# Patient Record
Sex: MALE | Race: WHITE | Employment: FULL TIME | URBAN - NONMETROPOLITAN AREA
[De-identification: names, ages, dates, MRNs, and addresses within clinical notes are randomized per-mention and may not be internally consistent; named-entity substitution may affect disease eponyms.]

---

## 2017-04-10 RX ORDER — ATORVASTATIN CALCIUM 40 MG/1
TABLET, FILM COATED ORAL
Qty: 90 TABLET | Refills: 1 | OUTPATIENT
Start: 2017-04-10

## 2017-04-11 RX ORDER — ATORVASTATIN CALCIUM 40 MG/1
TABLET, FILM COATED ORAL
Qty: 90 TABLET | Refills: 0 | Status: SHIPPED | OUTPATIENT
Start: 2017-04-11 | End: 2017-07-11 | Stop reason: SDUPTHER

## 2017-05-01 ENCOUNTER — OFFICE VISIT (OUTPATIENT)
Dept: FAMILY MEDICINE CLINIC | Age: 47
End: 2017-05-01

## 2017-05-01 VITALS
HEIGHT: 74 IN | WEIGHT: 239.4 LBS | RESPIRATION RATE: 16 BRPM | HEART RATE: 76 BPM | SYSTOLIC BLOOD PRESSURE: 120 MMHG | DIASTOLIC BLOOD PRESSURE: 80 MMHG | BODY MASS INDEX: 30.72 KG/M2

## 2017-05-01 DIAGNOSIS — Z00.00 ROUTINE GENERAL MEDICAL EXAMINATION AT A HEALTH CARE FACILITY: Primary | ICD-10-CM

## 2017-05-01 DIAGNOSIS — G47.33 OBSTRUCTIVE SLEEP APNEA: ICD-10-CM

## 2017-05-01 DIAGNOSIS — E78.00 PURE HYPERCHOLESTEROLEMIA: ICD-10-CM

## 2017-05-01 DIAGNOSIS — J30.1 SEASONAL ALLERGIC RHINITIS DUE TO POLLEN: ICD-10-CM

## 2017-05-01 DIAGNOSIS — Z11.4 SCREENING FOR HIV (HUMAN IMMUNODEFICIENCY VIRUS): ICD-10-CM

## 2017-05-01 DIAGNOSIS — E66.9 OBESITY (BMI 30.0-34.9): ICD-10-CM

## 2017-05-01 PROCEDURE — 99396 PREV VISIT EST AGE 40-64: CPT | Performed by: FAMILY MEDICINE

## 2017-05-01 ASSESSMENT — PATIENT HEALTH QUESTIONNAIRE - PHQ9
SUM OF ALL RESPONSES TO PHQ QUESTIONS 1-9: 0
1. LITTLE INTEREST OR PLEASURE IN DOING THINGS: 0
SUM OF ALL RESPONSES TO PHQ9 QUESTIONS 1 & 2: 0
2. FEELING DOWN, DEPRESSED OR HOPELESS: 0

## 2017-12-26 RX ORDER — FENOFIBRATE 160 MG/1
TABLET ORAL
Qty: 90 TABLET | Refills: 3 | Status: SHIPPED | OUTPATIENT
Start: 2017-12-26 | End: 2018-12-24 | Stop reason: SDUPTHER

## 2018-04-17 ENCOUNTER — OFFICE VISIT (OUTPATIENT)
Dept: FAMILY MEDICINE CLINIC | Age: 48
End: 2018-04-17
Payer: COMMERCIAL

## 2018-04-17 VITALS
BODY MASS INDEX: 31.97 KG/M2 | WEIGHT: 249 LBS | RESPIRATION RATE: 18 BRPM | HEART RATE: 80 BPM | DIASTOLIC BLOOD PRESSURE: 80 MMHG | SYSTOLIC BLOOD PRESSURE: 138 MMHG

## 2018-04-17 DIAGNOSIS — G47.9 FATIGUE DUE TO SLEEP PATTERN DISTURBANCE: ICD-10-CM

## 2018-04-17 DIAGNOSIS — Z00.00 ROUTINE GENERAL MEDICAL EXAMINATION AT A HEALTH CARE FACILITY: Primary | ICD-10-CM

## 2018-04-17 DIAGNOSIS — R06.83 SNORING: ICD-10-CM

## 2018-04-17 DIAGNOSIS — R53.83 FATIGUE DUE TO SLEEP PATTERN DISTURBANCE: ICD-10-CM

## 2018-04-17 DIAGNOSIS — J30.1 CHRONIC SEASONAL ALLERGIC RHINITIS DUE TO POLLEN: ICD-10-CM

## 2018-04-17 DIAGNOSIS — E78.00 PURE HYPERCHOLESTEROLEMIA: ICD-10-CM

## 2018-04-17 DIAGNOSIS — E66.9 OBESITY (BMI 30.0-34.9): ICD-10-CM

## 2018-04-17 PROCEDURE — 99214 OFFICE O/P EST MOD 30 MIN: CPT | Performed by: FAMILY MEDICINE

## 2018-04-17 RX ORDER — FENOFIBRATE 160 MG/1
TABLET ORAL
Qty: 90 TABLET | Refills: 3 | Status: CANCELLED | OUTPATIENT
Start: 2018-04-17

## 2018-04-17 RX ORDER — ATORVASTATIN CALCIUM 40 MG/1
TABLET, FILM COATED ORAL
Qty: 90 TABLET | Refills: 3 | Status: CANCELLED | OUTPATIENT
Start: 2018-04-17

## 2018-04-17 RX ORDER — ASPIRIN 81 MG/1
81 TABLET ORAL DAILY
Qty: 30 TABLET | Refills: 3 | COMMUNITY
Start: 2018-04-17

## 2018-04-19 ENCOUNTER — HOSPITAL ENCOUNTER (OUTPATIENT)
Age: 48
Discharge: HOME | End: 2018-04-19
Payer: COMMERCIAL

## 2018-04-19 DIAGNOSIS — Z00.00 ROUTINE GENERAL MEDICAL EXAMINATION AT A HEALTH CARE FACILITY: ICD-10-CM

## 2018-04-19 LAB
ALBUMIN SERPL-MCNC: 4.6 G/DL (ref 3.5–5.1)
ALP BLD-CCNC: 58 U/L (ref 38–126)
ALT SERPL-CCNC: 27 U/L (ref 11–66)
ANION GAP SERPL CALCULATED.3IONS-SCNC: 17 MEQ/L (ref 8–16)
AST SERPL-CCNC: 25 U/L (ref 5–40)
BILIRUB SERPL-MCNC: 0.5 MG/DL (ref 0.3–1.2)
BUN BLDV-MCNC: 22 MG/DL (ref 7–22)
CALCIUM SERPL-MCNC: 10.1 MG/DL (ref 8.5–10.5)
CHLORIDE BLD-SCNC: 103 MEQ/L (ref 98–111)
CHOLESTEROL, TOTAL: 158 MG/DL (ref 100–199)
CO2: 25 MEQ/L (ref 23–33)
CREAT SERPL-MCNC: 1.2 MG/DL (ref 0.4–1.2)
GFR SERPL CREATININE-BSD FRML MDRD: 65 ML/MIN/1.73M2
GLUCOSE BLD-MCNC: 99 MG/DL (ref 70–108)
HCT VFR BLD CALC: 41.8 % (ref 42–52)
HDLC SERPL-MCNC: 41 MG/DL
HEMOGLOBIN: 14.4 GM/DL (ref 14–18)
LDL CHOLESTEROL CALCULATED: 87 MG/DL
MCH RBC QN AUTO: 30.4 PG (ref 27–31)
MCHC RBC AUTO-ENTMCNC: 34.5 GM/DL (ref 33–37)
MCV RBC AUTO: 88.1 FL (ref 80–94)
PDW BLD-RTO: 13.3 % (ref 11.5–14.5)
PLATELET # BLD: 276 THOU/MM3 (ref 130–400)
PMV BLD AUTO: 8.8 FL (ref 7.4–10.4)
POTASSIUM SERPL-SCNC: 4.4 MEQ/L (ref 3.5–5.2)
RBC # BLD: 4.75 MILL/MM3 (ref 4.7–6.1)
SODIUM BLD-SCNC: 145 MEQ/L (ref 135–145)
TOTAL PROTEIN: 7.5 G/DL (ref 6.1–8)
TRIGL SERPL-MCNC: 148 MG/DL (ref 0–199)
TSH SERPL DL<=0.05 MIU/L-ACNC: 2.5 UIU/ML (ref 0.4–4.2)
WBC # BLD: 5.4 THOU/MM3 (ref 4.8–10.8)

## 2018-04-19 PROCEDURE — 80053 COMPREHEN METABOLIC PANEL: CPT

## 2018-04-19 PROCEDURE — 80061 LIPID PANEL: CPT

## 2018-04-19 PROCEDURE — 36415 COLL VENOUS BLD VENIPUNCTURE: CPT

## 2018-04-19 PROCEDURE — 84443 ASSAY THYROID STIM HORMONE: CPT

## 2018-04-19 PROCEDURE — 85027 COMPLETE CBC AUTOMATED: CPT

## 2018-05-07 ENCOUNTER — INITIAL CONSULT (OUTPATIENT)
Dept: PULMONOLOGY | Age: 48
End: 2018-05-07
Payer: COMMERCIAL

## 2018-05-07 VITALS
SYSTOLIC BLOOD PRESSURE: 124 MMHG | WEIGHT: 248.4 LBS | DIASTOLIC BLOOD PRESSURE: 82 MMHG | HEART RATE: 77 BPM | HEIGHT: 73 IN | RESPIRATION RATE: 14 BRPM | OXYGEN SATURATION: 97 % | BODY MASS INDEX: 32.92 KG/M2

## 2018-05-07 DIAGNOSIS — E66.9 OBESITY (BMI 30-39.9): ICD-10-CM

## 2018-05-07 DIAGNOSIS — R06.83 SNORING: ICD-10-CM

## 2018-05-07 DIAGNOSIS — G47.8 NON-RESTORATIVE SLEEP: ICD-10-CM

## 2018-05-07 DIAGNOSIS — G47.10 HYPERSOMNIA: Primary | ICD-10-CM

## 2018-05-07 PROCEDURE — 99203 OFFICE O/P NEW LOW 30 MIN: CPT | Performed by: INTERNAL MEDICINE

## 2018-05-14 ENCOUNTER — TELEPHONE (OUTPATIENT)
Dept: FAMILY MEDICINE CLINIC | Age: 48
End: 2018-05-14

## 2018-05-17 ENCOUNTER — TELEPHONE (OUTPATIENT)
Dept: FAMILY MEDICINE CLINIC | Age: 48
End: 2018-05-17

## 2018-05-17 ENCOUNTER — NURSE ONLY (OUTPATIENT)
Dept: FAMILY MEDICINE CLINIC | Age: 48
End: 2018-05-17
Payer: COMMERCIAL

## 2018-05-17 VITALS — SYSTOLIC BLOOD PRESSURE: 140 MMHG | HEART RATE: 76 BPM | DIASTOLIC BLOOD PRESSURE: 74 MMHG

## 2018-05-17 DIAGNOSIS — Z01.30 BP CHECK: Primary | ICD-10-CM

## 2018-05-17 PROCEDURE — 99211 OFF/OP EST MAY X REQ PHY/QHP: CPT | Performed by: FAMILY MEDICINE

## 2018-05-22 ENCOUNTER — HOSPITAL ENCOUNTER (OUTPATIENT)
Dept: SLEEP CENTER | Age: 48
Discharge: HOME | End: 2018-05-24
Payer: COMMERCIAL

## 2018-05-22 ENCOUNTER — TELEPHONE (OUTPATIENT)
Dept: SLEEP CENTER | Age: 48
End: 2018-05-22

## 2018-05-22 DIAGNOSIS — R06.83 SNORING: ICD-10-CM

## 2018-05-22 DIAGNOSIS — G47.10 HYPERSOMNIA: ICD-10-CM

## 2018-05-22 DIAGNOSIS — G47.8 NON-RESTORATIVE SLEEP: ICD-10-CM

## 2018-05-22 DIAGNOSIS — E66.9 OBESITY (BMI 30-39.9): ICD-10-CM

## 2018-05-22 PROCEDURE — 95810 POLYSOM 6/> YRS 4/> PARAM: CPT

## 2018-05-22 NOTE — TELEPHONE ENCOUNTER
Darwin Jones is scheduled for [facility], 05/22/18. This is what the note on 05/07/18 says. However, it doesn't read split night study on the order. Can you please place a new order stating split night? Thanks,      . Mikel Gallego

## 2018-05-23 LAB — STATUS: NORMAL

## 2018-06-05 ENCOUNTER — OFFICE VISIT (OUTPATIENT)
Dept: PULMONOLOGY | Age: 48
End: 2018-06-05
Payer: COMMERCIAL

## 2018-06-05 VITALS
OXYGEN SATURATION: 97 % | WEIGHT: 248.4 LBS | SYSTOLIC BLOOD PRESSURE: 112 MMHG | BODY MASS INDEX: 32.92 KG/M2 | HEART RATE: 73 BPM | HEIGHT: 73 IN | DIASTOLIC BLOOD PRESSURE: 74 MMHG

## 2018-06-05 DIAGNOSIS — Z72.821 POOR SLEEP HYGIENE: ICD-10-CM

## 2018-06-05 DIAGNOSIS — R06.83 SNORING: Primary | ICD-10-CM

## 2018-06-05 PROCEDURE — 99213 OFFICE O/P EST LOW 20 MIN: CPT | Performed by: PHYSICIAN ASSISTANT

## 2018-07-17 RX ORDER — ATORVASTATIN CALCIUM 40 MG/1
TABLET, FILM COATED ORAL
Qty: 90 TABLET | Refills: 3 | Status: SHIPPED | OUTPATIENT
Start: 2018-07-17 | End: 2018-07-17 | Stop reason: SDUPTHER

## 2018-07-17 RX ORDER — ATORVASTATIN CALCIUM 40 MG/1
TABLET, FILM COATED ORAL
Qty: 90 TABLET | Refills: 3 | Status: SHIPPED | OUTPATIENT
Start: 2018-07-17 | End: 2019-07-23 | Stop reason: SDUPTHER

## 2018-12-26 RX ORDER — FENOFIBRATE 160 MG/1
TABLET ORAL
Qty: 90 TABLET | Refills: 3 | Status: SHIPPED | OUTPATIENT
Start: 2018-12-26 | End: 2019-11-07 | Stop reason: SDUPTHER

## 2019-07-22 ENCOUNTER — TELEPHONE (OUTPATIENT)
Dept: FAMILY MEDICINE CLINIC | Age: 49
End: 2019-07-22

## 2019-07-22 NOTE — TELEPHONE ENCOUNTER
Spoke with Treva Mcclain at [facility] in 2 month supply Lipitor 10mg, 1 tab daily    Left message on patients voicemail stating rx was called in and any further questions to call the office back

## 2019-07-23 RX ORDER — ATORVASTATIN CALCIUM 40 MG/1
TABLET, FILM COATED ORAL
Qty: 90 TABLET | Refills: 3 | Status: SHIPPED | OUTPATIENT
Start: 2019-07-23 | End: 2020-08-20

## 2019-08-26 ENCOUNTER — HOSPITAL ENCOUNTER (OUTPATIENT)
Dept: GENERAL RADIOLOGY | Age: 49
Discharge: HOME | End: 2019-08-26
Payer: COMMERCIAL

## 2019-08-26 ENCOUNTER — OFFICE VISIT (OUTPATIENT)
Dept: FAMILY MEDICINE CLINIC | Age: 49
End: 2019-08-26
Payer: COMMERCIAL

## 2019-08-26 ENCOUNTER — HOSPITAL ENCOUNTER (OUTPATIENT)
Age: 49
Discharge: HOME | End: 2019-08-26
Payer: COMMERCIAL

## 2019-08-26 VITALS
HEART RATE: 67 BPM | RESPIRATION RATE: 18 BRPM | HEIGHT: 73 IN | SYSTOLIC BLOOD PRESSURE: 128 MMHG | DIASTOLIC BLOOD PRESSURE: 80 MMHG | BODY MASS INDEX: 33.95 KG/M2 | TEMPERATURE: 97.4 F | WEIGHT: 256.2 LBS | OXYGEN SATURATION: 98 %

## 2019-08-26 DIAGNOSIS — J30.2 SEASONAL ALLERGIES: ICD-10-CM

## 2019-08-26 DIAGNOSIS — E78.00 PURE HYPERCHOLESTEROLEMIA: ICD-10-CM

## 2019-08-26 DIAGNOSIS — M79.671 RIGHT FOOT PAIN: ICD-10-CM

## 2019-08-26 DIAGNOSIS — Z00.00 ROUTINE GENERAL MEDICAL EXAMINATION AT A HEALTH CARE FACILITY: Primary | ICD-10-CM

## 2019-08-26 DIAGNOSIS — K21.9 GASTROESOPHAGEAL REFLUX DISEASE WITHOUT ESOPHAGITIS: ICD-10-CM

## 2019-08-26 PROCEDURE — 99396 PREV VISIT EST AGE 40-64: CPT | Performed by: FAMILY MEDICINE

## 2019-08-26 PROCEDURE — 99214 OFFICE O/P EST MOD 30 MIN: CPT | Performed by: FAMILY MEDICINE

## 2019-08-26 PROCEDURE — 73630 X-RAY EXAM OF FOOT: CPT

## 2019-08-26 RX ORDER — OMEPRAZOLE 20 MG/1
20 CAPSULE, DELAYED RELEASE ORAL 2 TIMES DAILY
Qty: 30 CAPSULE | Refills: 3 | Status: SHIPPED | OUTPATIENT
Start: 2019-08-26 | End: 2019-12-06 | Stop reason: SDUPTHER

## 2019-08-26 ASSESSMENT — PATIENT HEALTH QUESTIONNAIRE - PHQ9
SUM OF ALL RESPONSES TO PHQ9 QUESTIONS 1 & 2: 0
1. LITTLE INTEREST OR PLEASURE IN DOING THINGS: 0
SUM OF ALL RESPONSES TO PHQ QUESTIONS 1-9: 0
2. FEELING DOWN, DEPRESSED OR HOPELESS: 0
SUM OF ALL RESPONSES TO PHQ QUESTIONS 1-9: 0

## 2019-08-26 NOTE — PROGRESS NOTES
pain on the lateral side worse with increased exercise and walking like with golfing. This got worse over the 4th of July weekend. The pain radiates now up the side of the calf from the foot. This is a little better when he wears regular shoes and worse with sandals and flip flops. Reviewed chart forpast medical history , surgical history , allergies, social history , family history and medications. Health Maintenance   Topic Date Due    Flu vaccine (1) 09/01/2019    Lipid screen  04/19/2023    DTaP/Tdap/Td vaccine (2 - Td) 10/22/2025    HIV screen  Completed    Pneumococcal 0-64 years Vaccine  Aged Out       Subjective:      Constitutional:Negative for fever, chills, diaphoresis, activity change, appetite change and fatigue. HENT: Negative for hearing loss, ear pain, congestion, sore throat, rhinorrhea, postnasal drip and ear discharge. Eyes: Negative for photophobia and visual disturbance. Respiratory: Negative for cough, chest tightness, shortness of breath and wheezing. Cardiovascular: Negative for chest pain and leg swelling. Gastrointestinal: Negative for nausea, vomiting, abdominal pain, diarrhea and constipation. Genitourinary: Negative for dysuria, urgency and frequency. Neurological: Negative for weakness, light-headedness and headaches. Psychiatric/Behavioral: Negative for sleep disturbance. Objective:     Vitals:    08/26/19 1550   BP: 128/80   Site: Left Upper Arm   Position: Sitting   Cuff Size: Large Adult   Pulse: 67   Resp: 18   Temp: 97.4 °F (36.3 °C)   TempSrc: Temporal   SpO2: 98%   Weight: 256 lb 3.2 oz (116.2 kg)   Height: 6' 0.99\" (1.854 m)     Wt Readings from Last 3 Encounters:   08/26/19 256 lb 3.2 oz (116.2 kg)   06/05/18 248 lb 6.4 oz (112.7 kg)   05/07/18 248 lb 6.4 oz (112.7 kg)       Physical Exam  Physical Exam   Constitutional: Vital signs are normal. He appears well-developed and well-nourished. He is active.    HENT:   Head: Normocephalic and atraumatic. Right Ear: Tympanic membrane, external ear and ear canal normal. No drainage or tenderness. Left Ear: Tympanic membrane, external ear and ear canal normal. No drainage or tenderness. Nose: Nose normal. No mucosal edema or rhinorrhea. Mouth/Throat: Uvula is midline, oropharynx is clear and moist and mucous membranes are normal. Mucous membranes are not pale. Normal dentition. No posterior oropharyngeal edema or posterior oropharyngeal erythema. Eyes: Lids are normal. Right eye exhibits no chemosis and no discharge. Left eye exhibits no chemosis and no drainage. Right conjunctiva has no hemorrhage. Left conjunctiva has no hemorrhage. Right eye exhibits normal extraocular motion. Left eye exhibits normal extraocular motion. Right pupil is round and reactive. Left pupil is round and reactive. Pupils are equal.   Cardiovascular: Normal rate, regular rhythm, S1 normal, S2 normal and normal heart sounds. Exam reveals no gallop. No murmur heard. Pulmonary/Chest: Effort normal and breath sounds normal. No respiratory distress. He has no wheezes. He has no rhonchi. He has no rales. Abdominal: Soft. Normal appearance and bowel sounds are normal. He exhibits no distension and no mass. There is no hepatosplenomegaly. No tenderness. He has no rigidity, no rebound and no guarding. No hernia. Musculoskeletal:   Right foot pain on the lateral side at the base of the fitfh metatarsal with palpation on the sole and the dorsum of the foot. Right lower leg: He exhibits no edema. Left lower leg: He exhibits no edema. Neurological: He is alert. Oriented and pleasent        Assessment/Plan   Luis Richardson was seen today for annual exam.    Diagnoses and all orders for this visit:    Routine general medical examination at a health care facility  -     CBC; Future  -     Comprehensive Metabolic Panel; Future  -     Lipid Panel;  Future  -     TSH With Reflex Ft4; Future    Gastroesophageal reflux

## 2019-08-27 ENCOUNTER — HOSPITAL ENCOUNTER (OUTPATIENT)
Age: 49
Discharge: HOME | End: 2019-08-27
Payer: COMMERCIAL

## 2019-08-27 DIAGNOSIS — Z00.00 ROUTINE GENERAL MEDICAL EXAMINATION AT A HEALTH CARE FACILITY: ICD-10-CM

## 2019-08-27 LAB
ALBUMIN SERPL-MCNC: 4.5 G/DL (ref 3.5–5.1)
ALP BLD-CCNC: 53 U/L (ref 38–126)
ALT SERPL-CCNC: 19 U/L (ref 11–66)
ANION GAP SERPL CALCULATED.3IONS-SCNC: 13 MEQ/L (ref 8–16)
AST SERPL-CCNC: 20 U/L (ref 5–40)
BILIRUB SERPL-MCNC: 0.4 MG/DL (ref 0.3–1.2)
BUN BLDV-MCNC: 21 MG/DL (ref 7–22)
CALCIUM SERPL-MCNC: 9.4 MG/DL (ref 8.5–10.5)
CHLORIDE BLD-SCNC: 104 MEQ/L (ref 98–111)
CHOLESTEROL, TOTAL: 168 MG/DL (ref 100–199)
CO2: 23 MEQ/L (ref 23–33)
CREAT SERPL-MCNC: 1.1 MG/DL (ref 0.4–1.2)
ERYTHROCYTE [DISTWIDTH] IN BLOOD BY AUTOMATED COUNT: 13 % (ref 11.5–14.5)
ERYTHROCYTE [DISTWIDTH] IN BLOOD BY AUTOMATED COUNT: 43.5 FL (ref 35–45)
GFR SERPL CREATININE-BSD FRML MDRD: 72 ML/MIN/1.73M2
GLUCOSE BLD-MCNC: 91 MG/DL (ref 70–108)
HCT VFR BLD CALC: 43.2 % (ref 42–52)
HDLC SERPL-MCNC: 39 MG/DL
HEMOGLOBIN: 13.9 GM/DL (ref 14–18)
LDL CHOLESTEROL CALCULATED: 95 MG/DL
MCH RBC QN AUTO: 29.7 PG (ref 26–33)
MCHC RBC AUTO-ENTMCNC: 32.2 GM/DL (ref 32.2–35.5)
MCV RBC AUTO: 92.3 FL (ref 80–94)
PLATELET # BLD: 273 THOU/MM3 (ref 130–400)
PMV BLD AUTO: 10.4 FL (ref 9.4–12.4)
POTASSIUM SERPL-SCNC: 4.4 MEQ/L (ref 3.5–5.2)
RBC # BLD: 4.68 MILL/MM3 (ref 4.7–6.1)
SODIUM BLD-SCNC: 140 MEQ/L (ref 135–145)
TOTAL PROTEIN: 6.7 G/DL (ref 6.1–8)
TRIGL SERPL-MCNC: 168 MG/DL (ref 0–199)
TSH SERPL DL<=0.05 MIU/L-ACNC: 2.4 UIU/ML (ref 0.4–4.2)
WBC # BLD: 5 THOU/MM3 (ref 4.8–10.8)

## 2019-08-27 PROCEDURE — 85027 COMPLETE CBC AUTOMATED: CPT

## 2019-08-27 PROCEDURE — 80053 COMPREHEN METABOLIC PANEL: CPT

## 2019-08-27 PROCEDURE — 36415 COLL VENOUS BLD VENIPUNCTURE: CPT

## 2019-08-27 PROCEDURE — 80061 LIPID PANEL: CPT

## 2019-08-27 PROCEDURE — 84443 ASSAY THYROID STIM HORMONE: CPT

## 2019-10-08 ENCOUNTER — TELEPHONE (OUTPATIENT)
Dept: FAMILY MEDICINE CLINIC | Age: 49
End: 2019-10-08

## 2019-10-09 ENCOUNTER — NURSE ONLY (OUTPATIENT)
Dept: FAMILY MEDICINE CLINIC | Age: 49
End: 2019-10-09
Payer: COMMERCIAL

## 2019-10-09 VITALS — DIASTOLIC BLOOD PRESSURE: 90 MMHG | HEART RATE: 68 BPM | SYSTOLIC BLOOD PRESSURE: 140 MMHG

## 2019-10-09 DIAGNOSIS — M79.602 PAIN OF LEFT UPPER EXTREMITY: ICD-10-CM

## 2019-10-09 DIAGNOSIS — I10 HYPERTENSION, UNSPECIFIED TYPE: Primary | ICD-10-CM

## 2019-10-09 PROCEDURE — 99211 OFF/OP EST MAY X REQ PHY/QHP: CPT | Performed by: FAMILY MEDICINE

## 2019-10-09 PROCEDURE — 93000 ELECTROCARDIOGRAM COMPLETE: CPT | Performed by: FAMILY MEDICINE

## 2019-10-10 RX ORDER — LISINOPRIL 10 MG/1
10 TABLET ORAL DAILY
Qty: 30 TABLET | Refills: 5 | Status: SHIPPED | OUTPATIENT
Start: 2019-10-10 | End: 2019-11-13 | Stop reason: SDUPTHER

## 2019-10-29 ENCOUNTER — HOSPITAL ENCOUNTER (OUTPATIENT)
Dept: NON INVASIVE DIAGNOSTICS | Age: 49
Discharge: HOME | End: 2019-10-29
Payer: COMMERCIAL

## 2019-10-29 DIAGNOSIS — M79.602 PAIN OF LEFT UPPER EXTREMITY: ICD-10-CM

## 2019-10-29 DIAGNOSIS — I10 HYPERTENSION, UNSPECIFIED TYPE: ICD-10-CM

## 2019-10-29 PROCEDURE — 93017 CV STRESS TEST TRACING ONLY: CPT

## 2019-11-08 RX ORDER — FENOFIBRATE 160 MG/1
TABLET ORAL
Qty: 90 TABLET | Refills: 4 | Status: SHIPPED | OUTPATIENT
Start: 2019-11-08 | End: 2020-08-31 | Stop reason: SDUPTHER

## 2019-11-13 ENCOUNTER — OFFICE VISIT (OUTPATIENT)
Dept: FAMILY MEDICINE CLINIC | Age: 49
End: 2019-11-13
Payer: COMMERCIAL

## 2019-11-13 VITALS
RESPIRATION RATE: 14 BRPM | HEIGHT: 73 IN | DIASTOLIC BLOOD PRESSURE: 68 MMHG | BODY MASS INDEX: 33.27 KG/M2 | SYSTOLIC BLOOD PRESSURE: 110 MMHG | HEART RATE: 72 BPM | WEIGHT: 251 LBS

## 2019-11-13 DIAGNOSIS — K21.9 GASTROESOPHAGEAL REFLUX DISEASE WITHOUT ESOPHAGITIS: ICD-10-CM

## 2019-11-13 DIAGNOSIS — E78.00 PURE HYPERCHOLESTEROLEMIA: ICD-10-CM

## 2019-11-13 DIAGNOSIS — J30.2 SEASONAL ALLERGIES: ICD-10-CM

## 2019-11-13 DIAGNOSIS — I10 HYPERTENSION, UNSPECIFIED TYPE: Primary | ICD-10-CM

## 2019-11-13 DIAGNOSIS — Z00.00 ROUTINE GENERAL MEDICAL EXAMINATION AT A HEALTH CARE FACILITY: ICD-10-CM

## 2019-11-13 PROBLEM — M79.671 PAIN IN RIGHT FOOT: Status: ACTIVE | Noted: 2019-11-13

## 2019-11-13 PROCEDURE — 99214 OFFICE O/P EST MOD 30 MIN: CPT | Performed by: FAMILY MEDICINE

## 2019-11-13 RX ORDER — LISINOPRIL 10 MG/1
10 TABLET ORAL DAILY
Qty: 90 TABLET | Refills: 5 | Status: SHIPPED | OUTPATIENT
Start: 2019-11-13 | End: 2020-08-31 | Stop reason: SDUPTHER

## 2019-12-06 DIAGNOSIS — K21.9 GASTROESOPHAGEAL REFLUX DISEASE WITHOUT ESOPHAGITIS: ICD-10-CM

## 2019-12-09 RX ORDER — OMEPRAZOLE 20 MG/1
CAPSULE, DELAYED RELEASE ORAL
Qty: 30 CAPSULE | Refills: 24 | Status: SHIPPED | OUTPATIENT
Start: 2019-12-09 | End: 2022-05-19 | Stop reason: ALTCHOICE

## 2020-01-21 ENCOUNTER — TELEPHONE (OUTPATIENT)
Dept: FAMILY MEDICINE CLINIC | Age: 50
End: 2020-01-21

## 2020-01-21 NOTE — TELEPHONE ENCOUNTER
Patient asking for preventive tamiflu-son tested + for influenza B      Per klass  tamiflu 75mg once a day [id number] called into [facility]. Phoned into [facility].

## 2020-08-20 RX ORDER — ATORVASTATIN CALCIUM 40 MG/1
TABLET, FILM COATED ORAL
Qty: 90 TABLET | Refills: 3 | Status: SHIPPED | OUTPATIENT
Start: 2020-08-20 | End: 2021-03-03

## 2020-08-31 ENCOUNTER — OFFICE VISIT (OUTPATIENT)
Dept: FAMILY MEDICINE CLINIC | Age: 50
End: 2020-08-31
Payer: COMMERCIAL

## 2020-08-31 VITALS
WEIGHT: 259 LBS | DIASTOLIC BLOOD PRESSURE: 68 MMHG | SYSTOLIC BLOOD PRESSURE: 110 MMHG | BODY MASS INDEX: 34.33 KG/M2 | TEMPERATURE: 97.7 F | RESPIRATION RATE: 16 BRPM | HEART RATE: 68 BPM | HEIGHT: 73 IN

## 2020-08-31 PROCEDURE — 99396 PREV VISIT EST AGE 40-64: CPT | Performed by: FAMILY MEDICINE

## 2020-08-31 RX ORDER — ROSUVASTATIN CALCIUM 20 MG/1
20 TABLET, COATED ORAL DAILY
Qty: 90 TABLET | Refills: 1 | Status: SHIPPED | OUTPATIENT
Start: 2020-08-31 | End: 2021-03-03 | Stop reason: SDUPTHER

## 2020-08-31 RX ORDER — FENOFIBRATE 160 MG/1
TABLET ORAL
Qty: 90 TABLET | Refills: 4 | Status: SHIPPED | OUTPATIENT
Start: 2020-08-31 | End: 2021-09-08 | Stop reason: SDUPTHER

## 2020-08-31 RX ORDER — OMEPRAZOLE 20 MG/1
CAPSULE, DELAYED RELEASE ORAL
Qty: 30 CAPSULE | Refills: 24 | Status: CANCELLED | OUTPATIENT
Start: 2020-08-31

## 2020-08-31 RX ORDER — LISINOPRIL 10 MG/1
10 TABLET ORAL DAILY
Qty: 90 TABLET | Refills: 5 | Status: SHIPPED | OUTPATIENT
Start: 2020-08-31 | End: 2021-09-08 | Stop reason: SDUPTHER

## 2020-08-31 SDOH — ECONOMIC STABILITY: FOOD INSECURITY: WITHIN THE PAST 12 MONTHS, YOU WORRIED THAT YOUR FOOD WOULD RUN OUT BEFORE YOU GOT MONEY TO BUY MORE.: NEVER TRUE

## 2020-08-31 SDOH — ECONOMIC STABILITY: FOOD INSECURITY: WITHIN THE PAST 12 MONTHS, THE FOOD YOU BOUGHT JUST DIDN'T LAST AND YOU DIDN'T HAVE MONEY TO GET MORE.: NEVER TRUE

## 2020-08-31 SDOH — ECONOMIC STABILITY: INCOME INSECURITY: HOW HARD IS IT FOR YOU TO PAY FOR THE VERY BASICS LIKE FOOD, HOUSING, MEDICAL CARE, AND HEATING?: NOT HARD AT ALL

## 2020-08-31 ASSESSMENT — PATIENT HEALTH QUESTIONNAIRE - PHQ9
SUM OF ALL RESPONSES TO PHQ QUESTIONS 1-9: 0
SUM OF ALL RESPONSES TO PHQ QUESTIONS 1-9: 0
1. LITTLE INTEREST OR PLEASURE IN DOING THINGS: 0
SUM OF ALL RESPONSES TO PHQ9 QUESTIONS 1 & 2: 0
2. FEELING DOWN, DEPRESSED OR HOPELESS: 0

## 2020-08-31 NOTE — PROGRESS NOTES
[address] [place] [address] Arjun Fraser  Dept: [phone]  Dept Fax: [phone]  Loc: [phone]    Elvis Stallings is a 52 y.o. male who presents today for:  Chief Complaint   Patient presents with    Follow-up     HTN, GERD, Hyperscholesterolemia           HPI:     HPI    Here for yearly recheck. Hyperlipidemia: Patient presents with hyperlipidemia. He was tested because screening. His last labs showed   Lab Results   Component Value Date    CHOL 168 08/27/2019    CHOL 158 04/19/2018    CHOL 162 05/04/2017     Lab Results   Component Value Date    TRIG 168 08/27/2019    TRIG 148 04/19/2018    TRIG 143 05/04/2017     Lab Results   Component Value Date    HDL 39 08/27/2019    HDL 41 04/19/2018    HDL 47 05/04/2017     Lab Results   Component Value Date    LDLCALC 95 08/27/2019    LDLCALC 87 04/19/2018    LDLCALC 86 05/04/2017     No results found for: LABVLDL, VLDL  No results found for: CHOLHDLRATIO  There is a family history of hyperlipidemia. There is not a family history of early ischemia heart disease. Blood pressures are higher at home. Much better on lisinopril. GERD controlled on PPI. Allergies controlled on OTC seasonal allergy medications. Reviewed chart forpast medical history , surgical history , allergies, social history , family history and medications.     Health Maintenance   Topic Date Due    Potassium monitoring  08/27/2020    Creatinine monitoring  08/27/2020    Lipid screen  08/27/2020    Flu vaccine (1) 09/01/2020    DTaP/Tdap/Td vaccine (2 - Td) 10/22/2025    HIV screen  Completed    Hepatitis A vaccine  Aged Out    Hepatitis B vaccine  Aged Out    Hib vaccine  Aged Out    Meningococcal (ACWY) vaccine  Aged Out    Pneumococcal 0-64 years Vaccine  Aged Out       Subjective:      Constitutional:Negative for fever, chills, diaphoresis, activity change, appetite change and fatigue. HENT: Negative for hearing loss, ear pain, congestion, sore throat, rhinorrhea, postnasal drip and ear discharge. Eyes: Negative for photophobia and visual disturbance. Respiratory: Negative for cough, chest tightness, shortness of breath and wheezing. Cardiovascular: Negative for chest pain and leg swelling. Gastrointestinal: Negative for nausea, vomiting, abdominal pain, diarrhea and constipation. Genitourinary: Negative for dysuria, urgency and frequency. Neurological: Negative for weakness, light-headedness and headaches. Psychiatric/Behavioral: Negative for sleep disturbance. Objective:     Vitals:    08/31/20 1609   BP: 110/68   Site: Left Upper Arm   Position: Sitting   Cuff Size: Large Adult   Pulse: 68   Resp: 16   Temp: 97.7 °F (36.5 °C)   TempSrc: Temporal   Weight: 259 lb (117.5 kg)   Height: 6' 0.99\" (1.854 m)     Wt Readings from Last 3 Encounters:   08/31/20 259 lb (117.5 kg)   11/13/19 251 lb (113.9 kg)   08/26/19 256 lb 3.2 oz (116.2 kg)       Physical Exam  Constitutional: Vital signs are normal. He appears well-developed and well-nourished. He is active. HENT:   Head: Normocephalic and atraumatic. Right Ear: Tympanic membrane, external ear and ear canal normal. No drainage or tenderness. Left Ear: Tympanic membrane, external ear and ear canal normal. No drainage or tenderness. Nose: Nose normal. No mucosal edema or rhinorrhea. Mouth/Throat: Uvula is midline, oropharynx is clear and moist and mucous membranes are normal. Mucous membranes are not pale. Normal dentition. No posterior oropharyngeal edema or posterior oropharyngeal erythema. Eyes: Lids are normal. Right eye exhibits no chemosis and no discharge. Left eye exhibits no chemosis and no drainage. Right conjunctiva has no hemorrhage. Left conjunctiva has no hemorrhage. Right eye exhibits normal extraocular motion. Left eye exhibits normal extraocular motion. Right pupil is round and reactive.  Left pupil is round and reactive. Pupils are equal.   Cardiovascular: Normal rate, regular rhythm, S1 normal, S2 normal and normal heart sounds. Exam reveals no gallop. No murmur heard. Pulmonary/Chest: Effort normal and breath sounds normal. No respiratory distress. He has no wheezes. He has no rhonchi. He has no rales. Abdominal: Soft. Normal appearance and bowel sounds are normal. He exhibits no distension and no mass. There is no hepatosplenomegaly. No tenderness. He has no rigidity, no rebound and no guarding. No hernia. Musculoskeletal:        Right lower leg: He exhibits no edema. Left lower leg: He exhibits no edema. Neurological: He is alert. Oriented and pleasent        Assessment/Plan   Shante Arriola was seen today for follow-up. Diagnoses and all orders for this visit:    Routine general medical examination at a health care facility  -     CBC; Future  -     Comprehensive Metabolic Panel, Fasting; Future  -     Lipid Panel; Future  -     TSH With Reflex Ft4; Future    Hypertension, unspecified type  -     lisinopril (PRINIVIL;ZESTRIL) 10 MG tablet; Take 1 tablet by mouth daily    Gastroesophageal reflux disease without esophagitis    Pure hypercholesterolemia  -     fenofibrate (TRIGLIDE) 160 MG tablet; TAKE 1 TABLET DAILY  -     rosuvastatin (CRESTOR) 20 MG tablet; Take 1 tablet by mouth daily    Seasonal allergies      No change to medications   Continue healthy diet and exercise  Aspirin daily     Discussed use, benefit, and side effectsof prescribed medications. All patient questions answered. Pt voiced understanding. Reviewed health maintenance. Instructed to continue current medications, diet and exercise. Patient agreed with treatment plan. Followup as directed.      Electronically signed by Stacy Trujillo MD

## 2020-11-12 NOTE — TELEPHONE ENCOUNTER
Can patient get a short term supply of atorvastatin (LIPITOR) 40 MG tablet to his local pharmacy. He is out,  Pharmacy is "[facility].     Please advise patient either way  dolv 4/17/18  donv 8/26/19 (this was next available) .

## 2020-12-29 ENCOUNTER — HOSPITAL ENCOUNTER (OUTPATIENT)
Age: 50
Setting detail: SPECIMEN
Discharge: HOME | End: 2020-12-29
Payer: COMMERCIAL

## 2020-12-29 ENCOUNTER — TELEPHONE (OUTPATIENT)
Dept: FAMILY MEDICINE CLINIC | Age: 50
End: 2020-12-29

## 2020-12-29 DIAGNOSIS — R51.9 SINUS HEADACHE: ICD-10-CM

## 2020-12-29 DIAGNOSIS — R05.9 COUGH: ICD-10-CM

## 2020-12-29 DIAGNOSIS — R53.83 OTHER FATIGUE: ICD-10-CM

## 2020-12-29 DIAGNOSIS — R09.81 NASAL CONGESTION: ICD-10-CM

## 2020-12-29 PROCEDURE — U0003 INFECTIOUS AGENT DETECTION BY NUCLEIC ACID (DNA OR RNA); SEVERE ACUTE RESPIRATORY SYNDROME CORONAVIRUS 2 (SARS-COV-2) (CORONAVIRUS DISEASE [COVID-19]), AMPLIFIED PROBE TECHNIQUE, MAKING USE OF HIGH THROUGHPUT TECHNOLOGIES AS DESCRIBED BY CMS-2020-01-R: HCPCS

## 2020-12-29 RX ORDER — AZITHROMYCIN 250 MG/1
TABLET, FILM COATED ORAL
Qty: 1 PACKET | Refills: 0 | Status: SHIPPED | OUTPATIENT
Start: 2020-12-29 | End: 2021-03-03 | Stop reason: ALTCHOICE

## 2020-12-29 RX ORDER — METHYLPREDNISOLONE 4 MG/1
TABLET ORAL
Qty: 1 KIT | Refills: 0 | Status: SHIPPED | OUTPATIENT
Start: 2020-12-29 | End: 2021-01-01

## 2020-12-29 RX ORDER — HYDROXYCHLOROQUINE SULFATE 200 MG/1
200 TABLET, FILM COATED ORAL 2 TIMES DAILY
Qty: 14 TABLET | Refills: 0 | Status: SHIPPED | OUTPATIENT
Start: 2020-12-29 | End: 2021-01-05

## 2021-01-01 ENCOUNTER — TELEPHONE (OUTPATIENT)
Dept: FAMILY MEDICINE CLINIC | Age: 51
End: 2021-01-01

## 2021-01-01 LAB — SARS-COV-2: DETECTED

## 2021-01-01 RX ORDER — LEVOFLOXACIN 500 MG/1
500 TABLET, FILM COATED ORAL DAILY
Qty: 10 TABLET | Refills: 0 | Status: SHIPPED | OUTPATIENT
Start: 2021-01-01 | End: 2021-01-11

## 2021-01-01 RX ORDER — METHYLPREDNISOLONE 4 MG/1
TABLET ORAL
Qty: 1 KIT | Refills: 0 | Status: SHIPPED | OUTPATIENT
Start: 2021-01-01 | End: 2021-01-07

## 2021-01-01 RX ORDER — ALBUTEROL SULFATE 90 UG/1
2 AEROSOL, METERED RESPIRATORY (INHALATION) 4 TIMES DAILY PRN
Qty: 1 INHALER | Refills: 1 | Status: SHIPPED | OUTPATIENT
Start: 2021-01-01 | End: 2021-03-03

## 2021-01-01 NOTE — TELEPHONE ENCOUNTER
Positive for covid    rx sent to the pharmacy for persistent cough    Call patient 1-1-21    Call on Monday to check progress

## 2021-01-04 NOTE — TELEPHONE ENCOUNTER
Pt states he is feeling better. Still has a cough. No fever. He will call if he needs anything. O2 97%. Pt is getting around and walking, slight SOB that resolves.

## 2021-01-05 ENCOUNTER — TELEPHONE (OUTPATIENT)
Dept: FAMILY MEDICINE CLINIC | Age: 51
End: 2021-01-05

## 2021-01-05 NOTE — TELEPHONE ENCOUNTER
Pt req note to return to work and +covid test to be emailed to him at [email or web address]     Per HD pt was able to return to work 1/2 but he wasn't feeling well enough to return    Pt still has a slight cough, fatigue, mild SOB with activity. He feels he will be up to returning to work 1/7.      Note written and emailed to above

## 2021-02-18 ENCOUNTER — HOSPITAL ENCOUNTER (OUTPATIENT)
Age: 51
Discharge: HOME | End: 2021-02-18
Payer: COMMERCIAL

## 2021-02-18 DIAGNOSIS — Z00.00 ROUTINE GENERAL MEDICAL EXAMINATION AT A HEALTH CARE FACILITY: ICD-10-CM

## 2021-02-18 LAB
ALBUMIN SERPL-MCNC: 4.3 G/DL (ref 3.5–5.1)
ALP BLD-CCNC: 48 U/L (ref 38–126)
ALT SERPL-CCNC: 30 U/L (ref 11–66)
ANION GAP SERPL CALCULATED.3IONS-SCNC: 9 MEQ/L (ref 8–16)
AST SERPL-CCNC: 29 U/L (ref 5–40)
BILIRUB SERPL-MCNC: 0.5 MG/DL (ref 0.3–1.2)
BUN BLDV-MCNC: 17 MG/DL (ref 7–22)
CALCIUM SERPL-MCNC: 9.6 MG/DL (ref 8.5–10.5)
CHLORIDE BLD-SCNC: 108 MEQ/L (ref 98–111)
CHOLESTEROL, TOTAL: 135 MG/DL (ref 100–199)
CO2: 24 MEQ/L (ref 23–33)
CREAT SERPL-MCNC: 1 MG/DL (ref 0.4–1.2)
ERYTHROCYTE [DISTWIDTH] IN BLOOD BY AUTOMATED COUNT: 13.9 % (ref 11.5–14.5)
ERYTHROCYTE [DISTWIDTH] IN BLOOD BY AUTOMATED COUNT: 47 FL (ref 35–45)
GFR SERPL CREATININE-BSD FRML MDRD: 79 ML/MIN/1.73M2
GLUCOSE FASTING: 96 MG/DL (ref 70–108)
HCT VFR BLD CALC: 42.2 % (ref 42–52)
HDLC SERPL-MCNC: 41 MG/DL
HEMOGLOBIN: 13.6 GM/DL (ref 14–18)
LDL CHOLESTEROL CALCULATED: 72 MG/DL
MCH RBC QN AUTO: 29.8 PG (ref 26–33)
MCHC RBC AUTO-ENTMCNC: 32.2 GM/DL (ref 32.2–35.5)
MCV RBC AUTO: 92.3 FL (ref 80–94)
PLATELET # BLD: 257 THOU/MM3 (ref 130–400)
PMV BLD AUTO: 10.7 FL (ref 9.4–12.4)
POTASSIUM SERPL-SCNC: 4.5 MEQ/L (ref 3.5–5.2)
RBC # BLD: 4.57 MILL/MM3 (ref 4.7–6.1)
SODIUM BLD-SCNC: 141 MEQ/L (ref 135–145)
TOTAL PROTEIN: 7.1 G/DL (ref 6.1–8)
TRIGL SERPL-MCNC: 111 MG/DL (ref 0–199)
TSH SERPL DL<=0.05 MIU/L-ACNC: 1.27 UIU/ML (ref 0.4–4.2)
WBC # BLD: 5.6 THOU/MM3 (ref 4.8–10.8)

## 2021-02-18 PROCEDURE — 85027 COMPLETE CBC AUTOMATED: CPT

## 2021-02-18 PROCEDURE — 80053 COMPREHEN METABOLIC PANEL: CPT

## 2021-02-18 PROCEDURE — 80061 LIPID PANEL: CPT

## 2021-02-18 PROCEDURE — 84443 ASSAY THYROID STIM HORMONE: CPT

## 2021-02-18 PROCEDURE — 36415 COLL VENOUS BLD VENIPUNCTURE: CPT

## 2021-03-03 ENCOUNTER — OFFICE VISIT (OUTPATIENT)
Dept: FAMILY MEDICINE CLINIC | Age: 51
End: 2021-03-03
Payer: COMMERCIAL

## 2021-03-03 VITALS
HEART RATE: 67 BPM | OXYGEN SATURATION: 97 % | TEMPERATURE: 97.1 F | HEIGHT: 73 IN | WEIGHT: 260.2 LBS | DIASTOLIC BLOOD PRESSURE: 74 MMHG | SYSTOLIC BLOOD PRESSURE: 124 MMHG | BODY MASS INDEX: 34.48 KG/M2 | RESPIRATION RATE: 18 BRPM

## 2021-03-03 DIAGNOSIS — E66.9 OBESITY (BMI 30.0-34.9): ICD-10-CM

## 2021-03-03 DIAGNOSIS — J30.2 SEASONAL ALLERGIES: ICD-10-CM

## 2021-03-03 DIAGNOSIS — M79.671 RIGHT FOOT PAIN: ICD-10-CM

## 2021-03-03 DIAGNOSIS — E78.00 PURE HYPERCHOLESTEROLEMIA: ICD-10-CM

## 2021-03-03 DIAGNOSIS — Z00.00 ROUTINE GENERAL MEDICAL EXAMINATION AT A HEALTH CARE FACILITY: ICD-10-CM

## 2021-03-03 DIAGNOSIS — G47.33 OBSTRUCTIVE SLEEP APNEA: ICD-10-CM

## 2021-03-03 DIAGNOSIS — I10 HYPERTENSION, UNSPECIFIED TYPE: ICD-10-CM

## 2021-03-03 DIAGNOSIS — K21.9 GASTROESOPHAGEAL REFLUX DISEASE WITHOUT ESOPHAGITIS: Primary | ICD-10-CM

## 2021-03-03 PROCEDURE — 99214 OFFICE O/P EST MOD 30 MIN: CPT | Performed by: FAMILY MEDICINE

## 2021-03-03 RX ORDER — ROSUVASTATIN CALCIUM 20 MG/1
20 TABLET, COATED ORAL DAILY
Qty: 90 TABLET | Refills: 3 | Status: SHIPPED | OUTPATIENT
Start: 2021-03-03 | End: 2021-03-04 | Stop reason: SDUPTHER

## 2021-03-03 NOTE — PROGRESS NOTES
Aged Out    Pneumococcal 0-64 years Vaccine  Aged Out       Subjective:      Constitutional:Negative for fever, chills, diaphoresis, activity change, appetite change and fatigue. HENT: Negative for hearing loss, ear pain, congestion, sore throat, rhinorrhea, postnasal drip and ear discharge. Eyes: Negative for photophobia and visual disturbance. Respiratory: Negative for cough, chest tightness, shortness of breath and wheezing. Cardiovascular: Negative for chest pain and leg swelling. Gastrointestinal: Negative for nausea, vomiting, abdominal pain, diarrhea and constipation. Genitourinary: Negative for dysuria, urgency and frequency. Neurological: Negative for weakness, light-headedness and headaches. Psychiatric/Behavioral: Negative for sleep disturbance.      :     Vitals:    03/03/21 1603   BP: 124/74   Site: Left Upper Arm   Position: Sitting   Cuff Size: Large Adult   Pulse: 67   Resp: 18   Temp: 97.1 °F (36.2 °C)   TempSrc: Temporal   SpO2: 97%   Weight: 260 lb 3.2 oz (118 kg)   Height: 6' 0.99\" (1.854 m)     Wt Readings from Last 3 Encounters:   03/03/21 260 lb 3.2 oz (118 kg)   08/31/20 259 lb (117.5 kg)   11/13/19 251 lb (113.9 kg)       Physical Exam  Physical Exam   Constitutional: Vital signs are normal. He appears well-developed and well-nourished. He is active. HENT:   Head: Normocephalic and atraumatic. Right Ear: Tympanic membrane, external ear and ear canal normal. No drainage or tenderness. Left Ear: Tympanic membrane, external ear and ear canal normal. No drainage or tenderness. Nose: Nose normal. No mucosal edema or rhinorrhea. Mouth/Throat: Uvula is midline, oropharynx is clear and moist and mucous membranes are normal. Mucous membranes are not pale. Normal dentition. No posterior oropharyngeal edema or posterior oropharyngeal erythema. Eyes: Lids are normal. Right eye exhibits no chemosis and no discharge. Left eye exhibits no chemosis and no drainage.  Right conjunctiva has no hemorrhage. Left conjunctiva has no hemorrhage. Right eye exhibits normal extraocular motion. Left eye exhibits normal extraocular motion. Right pupil is round and reactive. Left pupil is round and reactive. Pupils are equal.   Cardiovascular: Normal rate, regular rhythm, S1 normal, S2 normal and normal heart sounds. Exam reveals no gallop. No murmur heard. Pulmonary/Chest: Effort normal and breath sounds normal. No respiratory distress. He has no wheezes. He has no rhonchi. He has no rales. Abdominal: Soft. Normal appearance and bowel sounds are normal. He exhibits no distension and no mass. There is no hepatosplenomegaly. No tenderness. He has no rigidity, no rebound and no guarding. No hernia. Musculoskeletal:        Right lower leg: He exhibits no edema. Left lower leg: He exhibits no edema. Neurological: He is alert. Oriented and pleasent        Assessment/Plan   Mounika Bocanegra was seen today for 6 month follow-up, discuss labs and foot pain. Diagnoses and all orders for this visit:    Gastroesophageal reflux disease without esophagitis    Pure hypercholesterolemia  -     Discontinue: rosuvastatin (CRESTOR) 20 MG tablet; Take 1 tablet by mouth daily    Seasonal allergies    Hypertension, unspecified type    Routine general medical examination at a health care facility    Obstructive sleep apnea    Obesity (BMI 30.0-34. 9)    Right foot pain      No change to medications   Continue healthy diet and exercise  Aspirin daily    [place] foods  Small meals  No late meals     Discussed use, benefit, and side effectsof prescribed medications. All patient questions answered. Pt voiced understanding. Reviewed health maintenance. Instructed to continue current medications, diet and exercise. Patient agreed with treatment plan. Followup as directed.      Electronically signed by Patrick Felix MD

## 2021-03-04 DIAGNOSIS — E78.00 PURE HYPERCHOLESTEROLEMIA: ICD-10-CM

## 2021-03-04 RX ORDER — ROSUVASTATIN CALCIUM 20 MG/1
20 TABLET, COATED ORAL DAILY
Qty: 90 TABLET | Refills: 3 | Status: SHIPPED | OUTPATIENT
Start: 2021-03-04 | End: 2021-03-11 | Stop reason: SDUPTHER

## 2021-03-04 NOTE — TELEPHONE ENCOUNTER
Sandra Stock called requesting a refill on the following medications:  Requested Prescriptions     Pending Prescriptions Disp Refills    rosuvastatin (CRESTOR) 20 MG tablet 90 tablet 3     Sig: Take 1 tablet by mouth daily     Pharmacy verified: yes      Patient states that the Rx needs to go to [facility]; needs to be faxed over to the number listed on his card that was photocopied yesterday.          Date of last visit: 3/4/2021  Date of next visit (if applicable): Visit date not found

## 2021-03-11 ENCOUNTER — TELEPHONE (OUTPATIENT)
Dept: FAMILY MEDICINE CLINIC | Age: 51
End: 2021-03-11

## 2021-03-11 DIAGNOSIS — E78.00 PURE HYPERCHOLESTEROLEMIA: ICD-10-CM

## 2021-03-11 RX ORDER — ROSUVASTATIN CALCIUM 20 MG/1
20 TABLET, COATED ORAL DAILY
Qty: 90 TABLET | Refills: 3 | Status: SHIPPED | OUTPATIENT
Start: 2021-03-11 | End: 2021-03-23 | Stop reason: SDUPTHER

## 2021-03-23 ENCOUNTER — IMMUNIZATION (OUTPATIENT)
Dept: PRIMARY CARE CLINIC | Age: 51
End: 2021-03-23
Payer: COMMERCIAL

## 2021-03-23 DIAGNOSIS — E78.00 PURE HYPERCHOLESTEROLEMIA: ICD-10-CM

## 2021-03-23 PROCEDURE — 0001A COVID-19, PFIZER VACCINE 30MCG/0.3ML DOSE: CPT | Performed by: FAMILY MEDICINE

## 2021-03-23 PROCEDURE — 91300 COVID-19, PFIZER VACCINE 30MCG/0.3ML DOSE: CPT | Performed by: FAMILY MEDICINE

## 2021-03-23 RX ORDER — ROSUVASTATIN CALCIUM 20 MG/1
20 TABLET, COATED ORAL DAILY
Qty: 90 TABLET | Refills: 3 | Status: SHIPPED | OUTPATIENT
Start: 2021-03-23 | End: 2021-09-08 | Stop reason: SDUPTHER

## 2021-04-13 ENCOUNTER — IMMUNIZATION (OUTPATIENT)
Dept: PRIMARY CARE CLINIC | Age: 51
End: 2021-04-13
Payer: COMMERCIAL

## 2021-04-13 PROCEDURE — 91300 COVID-19, PFIZER VACCINE 30MCG/0.3ML DOSE: CPT

## 2021-04-13 PROCEDURE — 0002A COVID-19, PFIZER VACCINE 30MCG/0.3ML DOSE: CPT

## 2021-07-26 ENCOUNTER — TELEPHONE (OUTPATIENT)
Dept: FAMILY MEDICINE CLINIC | Age: 51
End: 2021-07-26

## 2021-07-26 RX ORDER — METHYLPREDNISOLONE 4 MG/1
TABLET ORAL
Qty: 1 KIT | Refills: 0 | Status: SHIPPED | OUTPATIENT
Start: 2021-07-26 | End: 2021-08-01

## 2021-09-08 ENCOUNTER — OFFICE VISIT (OUTPATIENT)
Dept: FAMILY MEDICINE CLINIC | Age: 51
End: 2021-09-08
Payer: COMMERCIAL

## 2021-09-08 VITALS
RESPIRATION RATE: 16 BRPM | DIASTOLIC BLOOD PRESSURE: 80 MMHG | TEMPERATURE: 97.6 F | SYSTOLIC BLOOD PRESSURE: 134 MMHG | BODY MASS INDEX: 35.1 KG/M2 | WEIGHT: 266 LBS | HEART RATE: 69 BPM

## 2021-09-08 DIAGNOSIS — K21.9 GASTROESOPHAGEAL REFLUX DISEASE WITHOUT ESOPHAGITIS: ICD-10-CM

## 2021-09-08 DIAGNOSIS — E66.9 OBESITY (BMI 30.0-34.9): ICD-10-CM

## 2021-09-08 DIAGNOSIS — J30.2 SEASONAL ALLERGIES: ICD-10-CM

## 2021-09-08 DIAGNOSIS — G47.33 OBSTRUCTIVE SLEEP APNEA: ICD-10-CM

## 2021-09-08 DIAGNOSIS — Z12.11 COLON CANCER SCREENING: ICD-10-CM

## 2021-09-08 DIAGNOSIS — I10 HYPERTENSION, UNSPECIFIED TYPE: ICD-10-CM

## 2021-09-08 DIAGNOSIS — R40.0 DAYTIME SOMNOLENCE: ICD-10-CM

## 2021-09-08 DIAGNOSIS — E78.00 PURE HYPERCHOLESTEROLEMIA: ICD-10-CM

## 2021-09-08 DIAGNOSIS — Z00.00 ROUTINE GENERAL MEDICAL EXAMINATION AT HEALTH CARE FACILITY: Primary | ICD-10-CM

## 2021-09-08 PROCEDURE — 99396 PREV VISIT EST AGE 40-64: CPT | Performed by: FAMILY MEDICINE

## 2021-09-08 RX ORDER — LISINOPRIL 10 MG/1
10 TABLET ORAL DAILY
Qty: 90 TABLET | Refills: 5 | Status: SHIPPED | OUTPATIENT
Start: 2021-09-08 | End: 2022-10-31 | Stop reason: SDUPTHER

## 2021-09-08 RX ORDER — FENOFIBRATE 160 MG/1
TABLET ORAL
Qty: 90 TABLET | Refills: 4 | Status: SHIPPED | OUTPATIENT
Start: 2021-09-08 | End: 2022-09-20 | Stop reason: SDUPTHER

## 2021-09-08 RX ORDER — ROSUVASTATIN CALCIUM 20 MG/1
20 TABLET, COATED ORAL DAILY
Qty: 90 TABLET | Refills: 3 | Status: SHIPPED | OUTPATIENT
Start: 2021-09-08 | End: 2022-06-28 | Stop reason: SDUPTHER

## 2021-09-08 SDOH — ECONOMIC STABILITY: FOOD INSECURITY: WITHIN THE PAST 12 MONTHS, YOU WORRIED THAT YOUR FOOD WOULD RUN OUT BEFORE YOU GOT MONEY TO BUY MORE.: NEVER TRUE

## 2021-09-08 SDOH — ECONOMIC STABILITY: FOOD INSECURITY: WITHIN THE PAST 12 MONTHS, THE FOOD YOU BOUGHT JUST DIDN'T LAST AND YOU DIDN'T HAVE MONEY TO GET MORE.: NEVER TRUE

## 2021-09-08 ASSESSMENT — SOCIAL DETERMINANTS OF HEALTH (SDOH): HOW HARD IS IT FOR YOU TO PAY FOR THE VERY BASICS LIKE FOOD, HOUSING, MEDICAL CARE, AND HEATING?: NOT HARD AT ALL

## 2021-09-08 NOTE — PROGRESS NOTES
[address] [place] [address] Arjun Fraser  Dept: [phone]  Dept Fax: [phone]  Loc: [phone]    Ange Trevino is a 54 y.o. male who presents today for:  Chief Complaint   Patient presents with    6 Month Follow-Up           HPI:     HPI  Here for yearly checkup    Hyperlipidemia: Patient presents with hyperlipidemia. He was tested because screening. His last labs showed   Lab Results   Component Value Date    CHOL 135 02/18/2021    CHOL 168 08/27/2019    CHOL 158 04/19/2018     Lab Results   Component Value Date    TRIG 111 02/18/2021    TRIG 168 08/27/2019    TRIG 148 04/19/2018     Lab Results   Component Value Date    HDL 41 02/18/2021    HDL 39 08/27/2019    HDL 41 04/19/2018     Lab Results   Component Value Date    LDLCALC 72 02/18/2021    [facility] 95 08/27/2019    LDLCALC 87 04/19/2018     No results found for: LABVLDL, VLDL  No results found for: CHOLHDLRATIO  There is a family history of hyperlipidemia. There is not a family history of early ischemia heart disease. Blood pressures are higher at home. Much better on lisinopril. GERD controlled on PPI. Allergies controlled on OTC seasonal allergy medications. Sleep study in 2018 was not significant enough for CPAP. Wears a mouth guard. Reviewed chart forpast medical history , surgical history , allergies, social history , family history and medications.     Health Maintenance   Topic Date Due    Hepatitis C screen  Never done    Colon cancer screen colonoscopy  Never done    Flu vaccine (1) 09/01/2021    Lipid screen  02/18/2022    Potassium monitoring  02/18/2022    Creatinine monitoring  02/18/2022    DTaP/Tdap/Td vaccine (2 - Td or Tdap) 10/22/2025    COVID-19 Vaccine  Completed    HIV screen  Completed    Hepatitis A vaccine  Aged Out    Hepatitis B vaccine  Aged Out    Hib vaccine  Aged Out    Meningococcal (ACWY) vaccine Aged Out    Pneumococcal 0-64 years Vaccine  Aged Out       Subjective:      Constitutional:Negative for fever, chills, diaphoresis, activity change, appetite change and fatigue. HENT: Negative for hearing loss, ear pain, congestion, sore throat, rhinorrhea, postnasal drip and ear discharge. Eyes: Negative for photophobia and visual disturbance. Respiratory: Negative for cough, chest tightness, shortness of breath and wheezing. Cardiovascular: Negative for chest pain and leg swelling. Gastrointestinal: Negative for nausea, vomiting, abdominal pain, diarrhea and constipation. Genitourinary: Negative for dysuria, urgency and frequency. Neurological: Negative for weakness, light-headedness and headaches. Psychiatric/Behavioral: Negative for sleep disturbance.      :     Vitals:    09/08/21 1606   BP: 134/80   Site: Left Upper Arm   Position: Sitting   Cuff Size: Large Adult   Pulse: 69   Resp: 16   Temp: 97.6 °F (36.4 °C)   TempSrc: Temporal   Weight: 266 lb (120.7 kg)     Wt Readings from Last 3 Encounters:   09/08/21 266 lb (120.7 kg)   03/03/21 260 lb 3.2 oz (118 kg)   08/31/20 259 lb (117.5 kg)       Physical Exam  Constitutional: Vital signs are normal. He appears well-developed and well-nourished. He is active. HENT:   Head: Normocephalic and atraumatic. Right Ear: Tympanic membrane, external ear and ear canal normal. No drainage or tenderness. Left Ear: Tympanic membrane, external ear and ear canal normal. No drainage or tenderness. Nose: Nose normal. No mucosal edema or rhinorrhea. Mouth/Throat: Uvula is midline, oropharynx is clear and moist and mucous membranes are normal. Mucous membranes are not pale. Normal dentition. No posterior oropharyngeal edema or posterior oropharyngeal erythema. Eyes: Lids are normal. Right eye exhibits no chemosis and no discharge. Left eye exhibits no chemosis and no drainage. Right conjunctiva has no hemorrhage. Left conjunctiva has no hemorrhage. Right eye exhibits normal extraocular motion. Left eye exhibits normal extraocular motion. Right pupil is round and reactive. Left pupil is round and reactive. Pupils are equal.   Cardiovascular: Normal rate, regular rhythm, S1 normal, S2 normal and normal heart sounds. Exam reveals no gallop. No murmur heard. Pulmonary/Chest: Effort normal and breath sounds normal. No respiratory distress. He has no wheezes. He has no rhonchi. He has no rales. Abdominal: Soft. Normal appearance and bowel sounds are normal. He exhibits no distension and no mass. There is no hepatosplenomegaly. No tenderness. He has no rigidity, no rebound and no guarding. No hernia. Musculoskeletal:        Right lower leg: He exhibits no edema. Left lower leg: He exhibits no edema. Neurological: He is alert. Oriented and pleasent        Assessment/Plan   Brenda Kirk was seen today for 6 month follow-up. Diagnoses and all orders for this visit:    Routine general medical examination at health care facility  -     CBC; Future  -     Comprehensive Metabolic Panel, Fasting; Future  -     Lipid Panel; Future  -     TSH With Reflex Ft4; Future  -     PSA Prostatic Specific Antigen; Future    Hypertension, unspecified type  -     lisinopril (PRINIVIL;ZESTRIL) 10 MG tablet; Take 1 tablet by mouth daily    Pure hypercholesterolemia  -     rosuvastatin (CRESTOR) 20 MG tablet; Take 1 tablet by mouth daily  -     fenofibrate (TRIGLIDE) 160 MG tablet; TAKE 1 TABLET DAILY    Gastroesophageal reflux disease without esophagitis    Seasonal allergies    Obstructive sleep apnea    Obesity (BMI 30.0-34. 9)    Daytime somnolence    Colon cancer screening  -     [facility] Brandi Leon MD, Gastroenterology, [facility]      No change to medications   Continue healthy diet and exercise  Aspirin daily    [place] foods  Small meals  No late meals      Discussed use, benefit, and side effectsof prescribed medications. All patient questions answered.   Pt voiced understanding. Reviewed health maintenance. Instructed to continue current medications, diet and exercise. Patient agreed with treatment plan. Followup as directed.      Electronically signed by Lalitha Sparrow MD

## 2021-11-12 ENCOUNTER — TELEPHONE (OUTPATIENT)
Dept: FAMILY MEDICINE CLINIC | Age: 51
End: 2021-11-12

## 2021-11-12 NOTE — TELEPHONE ENCOUNTER
Pt stated he has had symptoms for roughly 3-4 days. Pt did not want to be tested for covid. He said he already had it and is vaccinated unless we could guarantee him a positive covid test to be off work for 2 weeks. Pt aware of the information provided.

## 2021-11-12 NOTE — TELEPHONE ENCOUNTER
----- Message from Mariaelena Thomsa sent at 11/12/2021 10:15 AM EST -----  Subject: Message to Provider    QUESTIONS  Information for Provider? Patient wanting to have medication to combat   sinus infection due to symptoms of head congestion, sore throat, post   nasal drip.  ---------------------------------------------------------------------------  --------------  CALL BACK INFO  What is the best way for the office to contact you? OK to leave message on   voicemail  Preferred Call Back Phone Number? [phone]  ---------------------------------------------------------------------------  --------------  SCRIPT ANSWERS  Relationship to Patient?  Self

## 2022-01-17 ENCOUNTER — TELEPHONE (OUTPATIENT)
Dept: FAMILY MEDICINE CLINIC | Age: 52
End: 2022-01-17

## 2022-01-17 ENCOUNTER — HOSPITAL ENCOUNTER (OUTPATIENT)
Age: 52
Setting detail: SPECIMEN
Discharge: HOME | End: 2022-01-17
Payer: COMMERCIAL

## 2022-01-17 DIAGNOSIS — R05.9 COUGH: Primary | ICD-10-CM

## 2022-01-17 DIAGNOSIS — R05.9 COUGH: ICD-10-CM

## 2022-01-17 PROCEDURE — 87636 SARSCOV2 & INF A&B AMP PRB: CPT

## 2022-01-18 ENCOUNTER — TELEPHONE (OUTPATIENT)
Dept: FAMILY MEDICINE CLINIC | Age: 52
End: 2022-01-18

## 2022-01-18 LAB
INFLUENZA A: NOT DETECTED
INFLUENZA B: NOT DETECTED
SARS-COV-2 RNA, RT PCR: DETECTED

## 2022-01-18 NOTE — TELEPHONE ENCOUNTER
----- Message from Mary Jane Lemons MA sent at 1/18/2022  2:48 PM EST -----  Subject: Message to Provider    QUESTIONS  Information for Provider? Pt is calling to speak with office regarding his   my chart results for the flu vaccine and the covid vaccine. States covid   states positive, but there is a reference range that it follows which he   is not sure what exactly that means. If you could please call pt back @   number below ASAP to discuss as he is worried, thank you so much!  ---------------------------------------------------------------------------  --------------  CALL BACK INFO  What is the best way for the office to contact you? OK to leave message on   voicemail  Preferred Call Back Phone Number? [phone]  ---------------------------------------------------------------------------  --------------  SCRIPT ANSWERS  Relationship to Patient?  Self

## 2022-01-31 ENCOUNTER — TELEPHONE (OUTPATIENT)
Dept: FAMILY MEDICINE CLINIC | Age: 52
End: 2022-01-31

## 2022-01-31 DIAGNOSIS — U07.1 COVID-19: Primary | ICD-10-CM

## 2022-01-31 RX ORDER — AZITHROMYCIN 250 MG/1
TABLET, FILM COATED ORAL
Qty: 1 PACKET | Refills: 0 | Status: SHIPPED | OUTPATIENT
Start: 2022-01-31 | End: 2022-05-19 | Stop reason: ALTCHOICE

## 2022-01-31 RX ORDER — PREDNISONE 10 MG/1
TABLET ORAL
Qty: 32 TABLET | Refills: 0 | Status: SHIPPED | OUTPATIENT
Start: 2022-01-31 | End: 2022-05-19 | Stop reason: ALTCHOICE

## 2022-01-31 NOTE — TELEPHONE ENCOUNTER
----- Message from Ivy Curtis sent at 1/31/2022  9:50 AM EST -----  Subject: Message to Provider    QUESTIONS  Information for Provider? pt wants to know if he can get a prescription   for his congestion   ---------------------------------------------------------------------------  --------------  CALL BACK INFO  What is the best way for the office to contact you? OK to leave message on   voicemail  Preferred Call Back Phone Number? [phone]  ---------------------------------------------------------------------------  --------------  SCRIPT ANSWERS  Relationship to Patient?  Self

## 2022-01-31 NOTE — TELEPHONE ENCOUNTER
Pt has had congestion for a few months. mucinex is not helping. Pt had covid 1/17/22.  Is wondering if there is rx that he can take to help.he stated that it is worse at night and hard to sleep

## 2022-03-01 ENCOUNTER — TELEPHONE (OUTPATIENT)
Dept: FAMILY MEDICINE CLINIC | Age: 52
End: 2022-03-01

## 2022-03-01 ENCOUNTER — HOSPITAL ENCOUNTER (OUTPATIENT)
Age: 52
Discharge: HOME | End: 2022-03-01
Payer: COMMERCIAL

## 2022-03-01 DIAGNOSIS — R97.20 ELEVATED PSA: Primary | ICD-10-CM

## 2022-03-01 DIAGNOSIS — Z00.00 ROUTINE GENERAL MEDICAL EXAMINATION AT HEALTH CARE FACILITY: ICD-10-CM

## 2022-03-01 LAB
ALBUMIN SERPL-MCNC: 4.3 G/DL (ref 3.5–5.1)
ALP BLD-CCNC: 58 U/L (ref 38–126)
ALT SERPL-CCNC: 40 U/L (ref 11–66)
ANION GAP SERPL CALCULATED.3IONS-SCNC: 11 MEQ/L (ref 8–16)
AST SERPL-CCNC: 32 U/L (ref 5–40)
BILIRUB SERPL-MCNC: 0.5 MG/DL (ref 0.3–1.2)
BUN BLDV-MCNC: 17 MG/DL (ref 7–22)
CALCIUM SERPL-MCNC: 9.5 MG/DL (ref 8.5–10.5)
CHLORIDE BLD-SCNC: 106 MEQ/L (ref 98–111)
CHOLESTEROL, TOTAL: 151 MG/DL (ref 100–199)
CO2: 24 MEQ/L (ref 23–33)
CREAT SERPL-MCNC: 1.2 MG/DL (ref 0.4–1.2)
ERYTHROCYTE [DISTWIDTH] IN BLOOD BY AUTOMATED COUNT: 13.2 % (ref 11.5–14.5)
ERYTHROCYTE [DISTWIDTH] IN BLOOD BY AUTOMATED COUNT: 45 FL (ref 35–45)
GFR SERPL CREATININE-BSD FRML MDRD: 64 ML/MIN/1.73M2
GLUCOSE FASTING: 100 MG/DL (ref 70–108)
HCT VFR BLD CALC: 41.8 % (ref 42–52)
HDLC SERPL-MCNC: 42 MG/DL
HEMOGLOBIN: 13.5 GM/DL (ref 14–18)
LDL CHOLESTEROL CALCULATED: 70 MG/DL
MCH RBC QN AUTO: 30.4 PG (ref 26–33)
MCHC RBC AUTO-ENTMCNC: 32.3 GM/DL (ref 32.2–35.5)
MCV RBC AUTO: 94.1 FL (ref 80–94)
PLATELET # BLD: 262 THOU/MM3 (ref 130–400)
PMV BLD AUTO: 10.3 FL (ref 9.4–12.4)
POTASSIUM SERPL-SCNC: 4.2 MEQ/L (ref 3.5–5.2)
PROSTATE SPECIFIC ANTIGEN: 2.23 NG/ML (ref 0–1)
RBC # BLD: 4.44 MILL/MM3 (ref 4.7–6.1)
SODIUM BLD-SCNC: 141 MEQ/L (ref 135–145)
TOTAL PROTEIN: 6.8 G/DL (ref 6.1–8)
TRIGL SERPL-MCNC: 194 MG/DL (ref 0–199)
TSH SERPL DL<=0.05 MIU/L-ACNC: 2.02 UIU/ML (ref 0.4–4.2)
WBC # BLD: 4.4 THOU/MM3 (ref 4.8–10.8)

## 2022-03-01 PROCEDURE — 80053 COMPREHEN METABOLIC PANEL: CPT

## 2022-03-01 PROCEDURE — 85027 COMPLETE CBC AUTOMATED: CPT

## 2022-03-01 PROCEDURE — 80061 LIPID PANEL: CPT

## 2022-03-01 PROCEDURE — 36415 COLL VENOUS BLD VENIPUNCTURE: CPT

## 2022-03-01 PROCEDURE — 84443 ASSAY THYROID STIM HORMONE: CPT

## 2022-03-01 PROCEDURE — 84153 ASSAY OF PSA TOTAL: CPT

## 2022-05-18 ENCOUNTER — TELEPHONE (OUTPATIENT)
Dept: FAMILY MEDICINE CLINIC | Age: 52
End: 2022-05-18

## 2022-05-18 NOTE — TELEPHONE ENCOUNTER
Patient called c/o nasal congestion/drainage, cough, headache, sore throat x 3 weeks. Patient has been using saline nasal spray OTC. Patient asking for rx to be sent to [facility] pharmacy .

## 2022-05-19 ENCOUNTER — OFFICE VISIT (OUTPATIENT)
Dept: FAMILY MEDICINE CLINIC | Age: 52
End: 2022-05-19
Payer: COMMERCIAL

## 2022-05-19 VITALS
HEIGHT: 73 IN | DIASTOLIC BLOOD PRESSURE: 72 MMHG | BODY MASS INDEX: 35.84 KG/M2 | TEMPERATURE: 97.1 F | RESPIRATION RATE: 16 BRPM | SYSTOLIC BLOOD PRESSURE: 120 MMHG | WEIGHT: 270.4 LBS | HEART RATE: 85 BPM | OXYGEN SATURATION: 96 %

## 2022-05-19 DIAGNOSIS — J01.90 ACUTE BACTERIAL SINUSITIS: Primary | ICD-10-CM

## 2022-05-19 DIAGNOSIS — B96.89 ACUTE BACTERIAL SINUSITIS: Primary | ICD-10-CM

## 2022-05-19 PROCEDURE — 99213 OFFICE O/P EST LOW 20 MIN: CPT | Performed by: FAMILY MEDICINE

## 2022-05-19 RX ORDER — LORATADINE 10 MG/1
10 TABLET ORAL DAILY
Qty: 90 TABLET | COMMUNITY
Start: 2022-05-19

## 2022-05-19 RX ORDER — METHYLPREDNISOLONE 4 MG/1
TABLET ORAL
Qty: 1 KIT | Refills: 0 | Status: SHIPPED | OUTPATIENT
Start: 2022-05-19 | End: 2022-05-25

## 2022-05-19 RX ORDER — CEFDINIR 300 MG/1
300 CAPSULE ORAL 2 TIMES DAILY
Qty: 20 CAPSULE | Refills: 0 | Status: SHIPPED | OUTPATIENT
Start: 2022-05-19 | End: 2022-05-29

## 2022-05-19 ASSESSMENT — PATIENT HEALTH QUESTIONNAIRE - PHQ9
1. LITTLE INTEREST OR PLEASURE IN DOING THINGS: 0
SUM OF ALL RESPONSES TO PHQ QUESTIONS 1-9: 0
SUM OF ALL RESPONSES TO PHQ QUESTIONS 1-9: 0
2. FEELING DOWN, DEPRESSED OR HOPELESS: 0
SUM OF ALL RESPONSES TO PHQ QUESTIONS 1-9: 0
SUM OF ALL RESPONSES TO PHQ9 QUESTIONS 1 & 2: 0
SUM OF ALL RESPONSES TO PHQ QUESTIONS 1-9: 0

## 2022-05-19 NOTE — PROGRESS NOTES
[address] [place] [address] Arjun Fraser  Dept: [phone]  Dept Fax: [phone]  Loc: [phone]    Hannah Lebron is a 50 y.o. male who presents today for:  Chief Complaint   Patient presents with    Cough     Dry cough x 2 weeks    Congestion    Head Congestion           HPI:     HPI  Here for left sided face pain with PND and left ear pain. Associated with sore throat. Tried:  Saline spray and flonase with temporary relief. Reviewed chart forpast medical history , surgical history , allergies, social history , family history and medications. Health Maintenance   Topic Date Due    Hepatitis C screen  Never done    Depression Screen  08/31/2021    COVID-19 Vaccine (3 - Booster for Pfizer series) 09/13/2021    Shingles vaccine (1 of 2) Never done    Lipids  03/01/2023    DTaP/Tdap/Td vaccine (2 - Td or Tdap) 10/22/2025    Colorectal Cancer Screen  02/25/2032    Flu vaccine  Completed    HIV screen  Completed    Hepatitis A vaccine  Aged Out    Hepatitis B vaccine  Aged Out    Hib vaccine  Aged Out    Meningococcal (ACWY) vaccine  Aged Out    Pneumococcal 0-64 years Vaccine  Aged Out       Subjective:      Constitutional:Negative for fever, chills, diaphoresis, activity change, appetite change and fatigue. HENT: Negative for hearing loss, ear pain, congestion, sore throat, rhinorrhea, postnasal drip and ear discharge. Eyes: Negative for photophobia and visual disturbance. Respiratory: Negative for cough, chest tightness, shortness of breath and wheezing. Cardiovascular: Negative for chest pain and leg swelling. Gastrointestinal: Negative for nausea, vomiting, abdominal pain, diarrhea and constipation. Genitourinary: Negative for dysuria, urgency and frequency. Neurological: Negative for weakness, light-headedness and headaches.    Psychiatric/Behavioral: Negative for sleep disturbance.      :     Vitals:    05/19/22 1424   BP: 120/72   Site: Left Upper Arm   Position: Sitting   Cuff Size: Large Adult   Pulse: 85   Resp: 16   Temp: 97.1 °F (36.2 °C)   TempSrc: Temporal   SpO2: 96%   Weight: 270 lb 6.4 oz (122.7 kg)   Height: 6' 0.99\" (1.854 m)     Wt Readings from Last 3 Encounters:   05/19/22 270 lb 6.4 oz (122.7 kg)   09/08/21 266 lb (120.7 kg)   03/03/21 260 lb 3.2 oz (118 kg)       Physical Exam  Constitutional: Vital signs are normal. He appears well-developed and well-nourished. He is active. HENT:   Head: Normocephalic and atraumatic. Right Ear: Tympanic membrane, external ear and ear canal normal. No drainage or tenderness. Left Ear: Tympanic membrane, external ear and ear canal normal. No drainage or tenderness. Nose: Nose normal. moderate mucosal edema or rhinorrhea. Mouth/Throat: Uvula is midline, oropharynx is clear and moist and mucous membranes are normal. Mucous membranes are not pale. Normal dentition. No posterior oropharyngeal edema or posterior oropharyngeal erythema. Eyes: Lids are normal. Right eye exhibits no chemosis and no discharge. Left eye exhibits no chemosis and no drainage. Right conjunctiva has no hemorrhage. Left conjunctiva has no hemorrhage. Right eye exhibits normal extraocular motion. Left eye exhibits normal extraocular motion. Right pupil is round and reactive. Left pupil is round and reactive. Pupils are equal.   Cardiovascular: Normal rate, regular rhythm, S1 normal, S2 normal and normal heart sounds. Exam reveals no gallop. No murmur heard. Pulmonary/Chest: Effort normal and breath sounds normal. No respiratory distress. He has no wheezes. He has no rhonchi. He has no rales. Abdominal: Soft. Normal appearance and bowel sounds are normal. He exhibits no distension and no mass. There is no hepatosplenomegaly. No tenderness. He has no rigidity, no rebound and no guarding. No hernia.    Musculoskeletal:        Right lower leg: He exhibits no edema. Left lower leg: He exhibits no edema. Neurological: He is alert. Oriented and pleasent        Assessment/Plan   French Walters was seen today for cough, congestion and head congestion. Diagnoses and all orders for this visit:    Acute bacterial sinusitis  -     loratadine (CLARITIN) 10 MG tablet; Take 1 tablet by mouth daily  -     methylPREDNISolone (MEDROL DOSEPACK) 4 MG tablet; Take by mouth. -     cefdinir (OMNICEF) 300 MG capsule; Take 1 capsule by mouth 2 times daily for 10 days    Push fluids  Tylenol or ibuprofen prn fever  flonase every morning 2 sprays each side and saline nasal spray 4-5 times daily    Follow up if not better in 1 week or if symptoms get worse. Discussed use, benefit, and side effectsof prescribed medications. All patient questions answered. Pt voiced understanding. Reviewed health maintenance. Instructed to continue current medications, diet and exercise. Patient agreed with treatment plan. Followup as directed.      Electronically signed by Demetra Cardenas MD

## 2022-06-28 DIAGNOSIS — E78.00 PURE HYPERCHOLESTEROLEMIA: ICD-10-CM

## 2022-06-28 RX ORDER — ROSUVASTATIN CALCIUM 20 MG/1
20 TABLET, COATED ORAL DAILY
Qty: 90 TABLET | Refills: 3 | Status: SHIPPED | OUTPATIENT
Start: 2022-06-28

## 2022-06-28 NOTE — TELEPHONE ENCOUNTER
Last visit- 5/19/2022  Next visit- Visit date not found    Requested Prescriptions     Pending Prescriptions Disp Refills    rosuvastatin (CRESTOR) 20 MG tablet 90 tablet 3     Sig: Take 1 tablet by mouth daily

## 2022-08-29 ENCOUNTER — TELEPHONE (OUTPATIENT)
Dept: FAMILY MEDICINE CLINIC | Age: 52
End: 2022-08-29

## 2022-08-29 ENCOUNTER — OFFICE VISIT (OUTPATIENT)
Dept: FAMILY MEDICINE CLINIC | Age: 52
End: 2022-08-29
Payer: COMMERCIAL

## 2022-08-29 VITALS
HEART RATE: 69 BPM | BODY MASS INDEX: 36.31 KG/M2 | TEMPERATURE: 97.6 F | HEIGHT: 73 IN | DIASTOLIC BLOOD PRESSURE: 80 MMHG | SYSTOLIC BLOOD PRESSURE: 142 MMHG | WEIGHT: 274 LBS | OXYGEN SATURATION: 96 % | RESPIRATION RATE: 16 BRPM

## 2022-08-29 DIAGNOSIS — J32.9 RECURRENT SINUSITIS: Primary | ICD-10-CM

## 2022-08-29 PROCEDURE — 99213 OFFICE O/P EST LOW 20 MIN: CPT | Performed by: FAMILY MEDICINE

## 2022-08-29 RX ORDER — METHYLPREDNISOLONE 4 MG/1
TABLET ORAL
Qty: 1 KIT | Refills: 0 | Status: SHIPPED | OUTPATIENT
Start: 2022-08-29 | End: 2022-09-04

## 2022-08-29 RX ORDER — DOXYCYCLINE HYCLATE 100 MG
100 TABLET ORAL 2 TIMES DAILY
Qty: 20 TABLET | Refills: 0 | Status: SHIPPED | OUTPATIENT
Start: 2022-08-29 | End: 2022-09-08

## 2022-08-29 NOTE — PROGRESS NOTES
[address] [place] [address] Arjun Fraser  Dept: [phone]  Dept Fax: [phone]  Loc: [phone]    Wolf Sparrow is a 50 y.o. male who presents today for:  Chief Complaint   Patient presents with    Sinus Problem           HPI:     HPI  Here for sinus pressure on the left for 2 weeks. Taking claritin , nasal rinse, nasacort, but not able to stay on top of it. Mild headache, popping in the left ear. Tried:  tylenol and motrin prn with temporary relief. He has had multiple sinus infections 3-4 in the past year. He has a history of sinus and septum surgery. Reviewed chart forpast medical history , surgical history , allergies, social history , family history and medications. Health Maintenance   Topic Date Due    Hepatitis C screen  Never done    COVID-19 Vaccine (3 - Booster for Pfizer series) 09/13/2021    Shingles vaccine (1 of 2) Never done    Flu vaccine (1) 09/01/2022    Lipids  03/01/2023    Depression Screen  05/19/2023    Diabetes screen  03/01/2025    DTaP/Tdap/Td vaccine (2 - Td or Tdap) 10/22/2025    Colorectal Cancer Screen  02/25/2032    HIV screen  Completed    Hepatitis A vaccine  Aged Out    Hepatitis B vaccine  Aged Out    Hib vaccine  Aged Out    Meningococcal (ACWY) vaccine  Aged Out    Pneumococcal 0-64 years Vaccine  Aged Out       Subjective:      Constitutional:Negative for fever, chills, diaphoresis, activity change, appetite change and fatigue. HENT: Negative for hearing loss, ear pain, congestion, sore throat, rhinorrhea, postnasal drip and ear discharge. Eyes: Negative for photophobia and visual disturbance. Respiratory: Negative for cough, chest tightness, shortness of breath and wheezing. Cardiovascular: Negative for chest pain and leg swelling. Gastrointestinal: Negative for nausea, vomiting, abdominal pain, diarrhea and constipation.    Genitourinary: Negative for dysuria, urgency and frequency. Neurological: Negative for weakness, light-headedness and headaches. Psychiatric/Behavioral: Negative for sleep disturbance.      :     Vitals:    08/29/22 1547   BP: (!) 142/80   Site: Left Upper Arm   Position: Sitting   Cuff Size: Large Adult   Pulse: 69   Resp: 16   Temp: 97.6 °F (36.4 °C)   TempSrc: Temporal   SpO2: 96%   Weight: 274 lb (124.3 kg)   Height: 6' 0.99\" (1.854 m)     Wt Readings from Last 3 Encounters:   08/29/22 274 lb (124.3 kg)   05/19/22 270 lb 6.4 oz (122.7 kg)   09/08/21 266 lb (120.7 kg)       Physical Exam  Physical Exam   Constitutional: Vital signs are normal. He appears well-developed and well-nourished. He is active. HENT:   Head: Normocephalic and atraumatic. Right Ear: Tympanic membrane, external ear and ear canal normal. No drainage or tenderness. Left Ear: Tympanic membrane, external ear and ear canal normal. No drainage or tenderness. Nose: Nose normal. Moderate to severe mucosal edema and rhinorrhea worse on the left with a significant deviated septum. Mouth/Throat: Uvula is midline, oropharynx is clear and moist and mucous membranes are normal. Mucous membranes are not pale. Normal dentition. No posterior oropharyngeal edema or posterior oropharyngeal erythema. Eyes: Lids are normal. Right eye exhibits no chemosis and no discharge. Left eye exhibits no chemosis and no drainage. Right conjunctiva has no hemorrhage. Left conjunctiva has no hemorrhage. Right eye exhibits normal extraocular motion. Left eye exhibits normal extraocular motion. Right pupil is round and reactive. Left pupil is round and reactive. Pupils are equal.   Cardiovascular: Normal rate, regular rhythm, S1 normal, S2 normal and normal heart sounds. Exam reveals no gallop. No murmur heard. Pulmonary/Chest: Effort normal and breath sounds normal. No respiratory distress. He has no wheezes. He has no rhonchi. He has no rales. Abdominal: Soft.  Normal appearance and bowel sounds are normal. He exhibits no distension and no mass. There is no hepatosplenomegaly. No tenderness. He has no rigidity, no rebound and no guarding. No hernia. Musculoskeletal:        Right lower leg: He exhibits no edema. Left lower leg: He exhibits no edema. Neurological: He is alert. Oriented and pleasent        Assessment/Plan   Susan Law was seen today for sinus problem. Diagnoses and all orders for this visit:    Recurrent sinusitis  -     methylPREDNISolone (MEDROL DOSEPACK) 4 MG tablet; Take by mouth.  -     doxycycline hyclate (VIBRA-TABS) 100 MG tablet; Take 1 tablet by mouth 2 times daily for 10 days  -     CT SINUS WO CONTRAST; Future  Push fluids  Tylenol or ibuprofen prn fever  Astepro and flonase every morning 2 sprays each side and saline nasal spray 4-5 times daily  Cotinue claritin daily    Follow up if not better in 1 week or if symptoms get worse. Discussed use, benefit, and side effectsof prescribed medications. All patient questions answered. Pt voiced understanding. Reviewed health maintenance. Instructed to continue current medications, diet and exercise. Patient agreed with treatment plan. Followup as directed.      Electronically signed by Feliciano Rockwell MD

## 2022-08-29 NOTE — TELEPHONE ENCOUNTER
Patient called stating he has had sinus laura, sinus drainage, sore nasal cavity, left ear pain x1 week. Covid neg. Has been doing nasal flushes, flonase, and saline without much success. He is asking for a script to be called. Please advise.

## 2022-09-20 DIAGNOSIS — E78.00 PURE HYPERCHOLESTEROLEMIA: ICD-10-CM

## 2022-09-20 RX ORDER — FENOFIBRATE 160 MG/1
TABLET ORAL
Qty: 90 TABLET | Refills: 4 | Status: SHIPPED | OUTPATIENT
Start: 2022-09-20

## 2022-10-31 DIAGNOSIS — I10 HYPERTENSION, UNSPECIFIED TYPE: ICD-10-CM

## 2022-10-31 RX ORDER — LISINOPRIL 10 MG/1
10 TABLET ORAL DAILY
Qty: 90 TABLET | Refills: 5 | Status: SHIPPED | OUTPATIENT
Start: 2022-10-31

## 2022-12-29 ENCOUNTER — TELEPHONE (OUTPATIENT)
Dept: FAMILY MEDICINE CLINIC | Age: 52
End: 2022-12-29

## 2022-12-29 RX ORDER — AMOXICILLIN AND CLAVULANATE POTASSIUM 500; 125 MG/1; MG/1
1 TABLET, FILM COATED ORAL 3 TIMES DAILY
Qty: 30 TABLET | Refills: 0 | Status: SHIPPED | OUTPATIENT
Start: 2022-12-29 | End: 2023-01-08

## 2022-12-29 RX ORDER — PREDNISONE 20 MG/1
20 TABLET ORAL DAILY
Qty: 5 TABLET | Refills: 0 | Status: SHIPPED | OUTPATIENT
Start: 2022-12-29 | End: 2023-01-03

## 2022-12-29 NOTE — TELEPHONE ENCOUNTER
Pt called stating he has had sinus pressure, drainage, cough, and sore throat, all occurring on the left side of his face, x10 days. He denies any fevers. He is requesting an ATB sent to [facility]. He has been using Sudafed to relieve his sx's.

## 2022-12-29 NOTE — TELEPHONE ENCOUNTER
Rx sent  Push fluids  Tylenol or ibuprofen prn fever  Cool mist Humidifier in the bedroom  flonase every morning 2 sprays each side and saline nasal spray 4-5 times daily    Follow up if not better in 1 week or if symptoms get worse.

## 2023-01-04 ENCOUNTER — TELEPHONE (OUTPATIENT)
Dept: FAMILY MEDICINE CLINIC | Age: 53
End: 2023-01-04

## 2023-01-04 ENCOUNTER — HOSPITAL ENCOUNTER (OUTPATIENT)
Dept: CT IMAGING | Age: 53
Discharge: HOME | End: 2023-01-04
Payer: COMMERCIAL

## 2023-01-04 DIAGNOSIS — J32.9 RECURRENT SINUSITIS: ICD-10-CM

## 2023-01-04 PROCEDURE — 70486 CT MAXILLOFACIAL W/O DYE: CPT

## 2023-01-04 NOTE — TELEPHONE ENCOUNTER
Small cyst in the left maxillary sinus  Otherwise the sinuses are not too bad  Consider starting with referral to allergist    Call patient

## 2023-01-05 NOTE — TELEPHONE ENCOUNTER
Spoke with patient. Patient states he saw an allergist in the past and was found to be allergic to dust mites, nothing else.     Please advise

## 2023-05-09 ENCOUNTER — OFFICE VISIT (OUTPATIENT)
Dept: FAMILY MEDICINE CLINIC | Age: 53
End: 2023-05-09
Payer: COMMERCIAL

## 2023-05-09 VITALS
RESPIRATION RATE: 16 BRPM | HEART RATE: 68 BPM | DIASTOLIC BLOOD PRESSURE: 70 MMHG | SYSTOLIC BLOOD PRESSURE: 130 MMHG | TEMPERATURE: 97.9 F

## 2023-05-09 DIAGNOSIS — J30.2 SEASONAL ALLERGIES: ICD-10-CM

## 2023-05-09 DIAGNOSIS — G47.33 OBSTRUCTIVE SLEEP APNEA: ICD-10-CM

## 2023-05-09 DIAGNOSIS — J32.9 RECURRENT SINUSITIS: ICD-10-CM

## 2023-05-09 DIAGNOSIS — Z00.00 ROUTINE GENERAL MEDICAL EXAMINATION AT HEALTH CARE FACILITY: Primary | ICD-10-CM

## 2023-05-09 DIAGNOSIS — K21.9 GASTROESOPHAGEAL REFLUX DISEASE WITHOUT ESOPHAGITIS: ICD-10-CM

## 2023-05-09 DIAGNOSIS — I10 HYPERTENSION, UNSPECIFIED TYPE: ICD-10-CM

## 2023-05-09 DIAGNOSIS — R97.20 ELEVATED PSA: ICD-10-CM

## 2023-05-09 DIAGNOSIS — E78.00 PURE HYPERCHOLESTEROLEMIA: ICD-10-CM

## 2023-05-09 DIAGNOSIS — M18.11 DEGENERATIVE ARTHRITIS OF THUMB, RIGHT: ICD-10-CM

## 2023-05-09 DIAGNOSIS — R40.0 DAYTIME SOMNOLENCE: ICD-10-CM

## 2023-05-09 DIAGNOSIS — G56.22 ULNAR TUNNEL SYNDROME, LEFT: ICD-10-CM

## 2023-05-09 DIAGNOSIS — E66.9 OBESITY (BMI 30.0-34.9): ICD-10-CM

## 2023-05-09 PROCEDURE — 99396 PREV VISIT EST AGE 40-64: CPT | Performed by: FAMILY MEDICINE

## 2023-05-09 PROCEDURE — 3075F SYST BP GE 130 - 139MM HG: CPT | Performed by: FAMILY MEDICINE

## 2023-05-09 PROCEDURE — 3078F DIAST BP <80 MM HG: CPT | Performed by: FAMILY MEDICINE

## 2023-05-09 RX ORDER — FENOFIBRATE 160 MG/1
TABLET ORAL
Qty: 90 TABLET | Refills: 5 | Status: SHIPPED | OUTPATIENT
Start: 2023-05-09

## 2023-05-09 RX ORDER — ROSUVASTATIN CALCIUM 20 MG/1
20 TABLET, COATED ORAL DAILY
Qty: 90 TABLET | Refills: 5 | Status: SHIPPED | OUTPATIENT
Start: 2023-05-09

## 2023-05-09 RX ORDER — OMEPRAZOLE 20 MG/1
20 CAPSULE, DELAYED RELEASE ORAL
Qty: 90 CAPSULE | Refills: 1 | Status: SHIPPED | OUTPATIENT
Start: 2023-05-09

## 2023-05-09 RX ORDER — LISINOPRIL 10 MG/1
10 TABLET ORAL DAILY
Qty: 90 TABLET | Refills: 5 | Status: SHIPPED | OUTPATIENT
Start: 2023-05-09

## 2023-05-09 SDOH — ECONOMIC STABILITY: HOUSING INSECURITY
IN THE LAST 12 MONTHS, WAS THERE A TIME WHEN YOU DID NOT HAVE A STEADY PLACE TO SLEEP OR SLEPT IN A SHELTER (INCLUDING NOW)?: NO

## 2023-05-09 SDOH — ECONOMIC STABILITY: INCOME INSECURITY: HOW HARD IS IT FOR YOU TO PAY FOR THE VERY BASICS LIKE FOOD, HOUSING, MEDICAL CARE, AND HEATING?: NOT HARD AT ALL

## 2023-05-09 SDOH — ECONOMIC STABILITY: FOOD INSECURITY: WITHIN THE PAST 12 MONTHS, YOU WORRIED THAT YOUR FOOD WOULD RUN OUT BEFORE YOU GOT MONEY TO BUY MORE.: NEVER TRUE

## 2023-05-09 SDOH — ECONOMIC STABILITY: FOOD INSECURITY: WITHIN THE PAST 12 MONTHS, THE FOOD YOU BOUGHT JUST DIDN'T LAST AND YOU DIDN'T HAVE MONEY TO GET MORE.: NEVER TRUE

## 2023-05-09 ASSESSMENT — PATIENT HEALTH QUESTIONNAIRE - PHQ9
1. LITTLE INTEREST OR PLEASURE IN DOING THINGS: 0
SUM OF ALL RESPONSES TO PHQ QUESTIONS 1-9: 0
SUM OF ALL RESPONSES TO PHQ9 QUESTIONS 1 & 2: 0
SUM OF ALL RESPONSES TO PHQ QUESTIONS 1-9: 0
SUM OF ALL RESPONSES TO PHQ QUESTIONS 1-9: 0
2. FEELING DOWN, DEPRESSED OR HOPELESS: 0
SUM OF ALL RESPONSES TO PHQ QUESTIONS 1-9: 0

## 2023-05-09 NOTE — PROGRESS NOTES
Patient:   LA OLIVA            MRN: [id number]            FIN: [id number]               Age:   47 years     Sex:  MALE     :  74   Associated Diagnoses:   None   Author:   AKASH MCDONALD      Subjective   Chief complaint   Initial Evalutation  See dictation.     Allergies (3) Active Reaction  bee pollen None Documented  Cats None Documented  NKA, None Documented    Vitals between:   28-OCT-2018 21:27:47   TO   29-OCT-2018 21:27:47                   LAST RESULT MINIMUM MAXIMUM  Temperature 36.8 36.0 36.8  Heart Rate 85 70 86  Respiratory Rate 16 16 20  NISBP           146 117 147  NIDBP           88 63 96  NIMBP           88 78 106  SpO2                    100 96 100  Medications (16) Active  Scheduled: (6)  *Do NOT give Short-Acting Narcotics or Opiates  1 each, N/A, Daily  Acetaminophen 500 mg tab  1,000 mg 2 tab, Oral, Q8H  ceFAZolin  2,000 mg 15 mL, Slow IV Push, Q8H (variable)  Chlorhexidine gluconate 0.12% ORAL RINSE 15 mL repack  15 mL, Oral Mucosa, Q12H  Docusate sodium 100 mg cap  100 mg 1 cap, Oral, BID  Senna-docusate sodium 8.6-50 mg tab  2 tab, Oral, Q Bedtime  Continuous: (1)  Dextrose 5%-0.9% NaCl-KCl 20 mEq 1,000 mL  1,000 mL, IV, 80 mL/hr  PRN: (9)  Acetaminophen 325 mg tab  650 mg 2 tab, Oral, Q4H  Benzocaine-menthol 15-3.6 mg lozenge  1 lozenge, Oral Mucosa, Q4H  Bisacodyl 10 mg suppos  10 mg 1 suppository, Rectal, Daily  Calcium carbonate 500 mg chew tab [Ca 200 mg]  1,000 mg 2 tab, Chewed, Q8H  HYDROmorphone PCA 0.2 mg/mL-NS  6 mg 30 mL, IV PCA, As Directed PRN  Milk of magnesia 8% 30 mL oral susp UD  30 mL, Oral, Daily  Naloxone 0.4 mg/1 mL inj SDV  0.2 mg 0.5 mL, IV Push, On Call  Ondansetron 4 mg disintegrating tab  4 mg 1 tab, Oral, Q6H  Ondansetron 4 mg/2 mL inj SDV  4 mg 2 mL, Slow IV Push, Q6H    Vitals between:   28-OCT-2018 21:27:47   TO   29-OCT-2018 21:27:47                   LAST RESULT MINIMUM MAXIMUM  Temperature 36.8 36.0 36.8  Heart  [address] [place] [address] Arjun Fraser  Dept: [phone]  Dept Fax: [phone]  Loc: [phone]    Rob Osman is a 48 y.o. male who presents today for:  Chief Complaint   Patient presents with    Annual Exam     Feeling like food gets stuck in his throat. Wrist Pain     Right wrist radiates up arm        HPI:     HPI  Here for yearly checkup    Hyperlipidemia: Patient presents with hyperlipidemia. He was tested because screening. His last labs showed   Lab Results   Component Value Date    CHOL 151 03/01/2022    CHOL 135 02/18/2021    CHOL 168 08/27/2019     Lab Results   Component Value Date    TRIG 194 03/01/2022    TRIG 111 02/18/2021    TRIG 168 08/27/2019     Lab Results   Component Value Date    HDL 42 03/01/2022    HDL 41 02/18/2021    HDL 39 08/27/2019     Lab Results   Component Value Date    LDLCALC 70 03/01/2022    LDLCALC 72 02/18/2021    [address] 95 08/27/2019     No results found for: LABVLDL, VLDL  No results found for: CHOLHDLRATIO  There is a family history of hyperlipidemia. There is not a family history of early ischemia heart disease. Blood pressures are higher at home. Much better on lisinopril. GERD controlled on PPI prn and tums prn. He is having more difficulty with swallowing the dry stringy foods. Seems to happen more with PND. Discussed taking prilosec for 90 days in a row to heal the reflux which is likely causing esophageal spasm. Allergies controlled on OTC seasonal allergy medications. Using claritin daily and flonase every morning 2 sprays each side and saline nasal spray 4-5 times daily      Sleep study in 2018 was not significant enough for CPAP. Wears a mouth guard. Right wrist pain in the am better as the day goes on. 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 Discussed braces at night and ice at the end of the day. Rate 85 70 86  Respiratory Rate 16 16 20  NISBP           146 117 147  NIDBP           88 63 96  NIMBP           88 78 106  SpO2                    100 96 100     Dosing Weight:   74.54 kg    10/29/2018 06:03  Most Recent Clinical Weight:   74.54  kg    10/29/2018 06:03    LINES  Peripheral Intravenous Hand Left   Gauge: 20   Charted: 10/29/18 15:00  Inserted: 10/29/18   Days Since Insertion: 0 days  Indication of Use: Hydration  Peripheral Intravenous Hand Left   Gauge: 18   Charted: 10/29/18 15:00  Inserted: 10/29/18   Days Since Insertion:  Indication of Use: Saline Lock    No Qualifying Labs are resulted on this patient in the last 24 hours         Health Status   Allergies:    Allergic Reactions (All)  Severity Not Documented  Bee pollen- No reactions were documented.  Cats- No reactions were documented.  NKA,- No reactions were documented.  Canceled/Inactive Reactions (All)  NKA  No Known Medication Allergies,    Allergies (3) Active Reaction  bee pollen None Documented  Cats None Documented  NKA, None Documented     Current medications:    Medications (16) Active  Scheduled: (6)  *Do NOT give Short-Acting Narcotics or Opiates  1 each, N/A, Daily  Acetaminophen 500 mg tab  1,000 mg 2 tab, Oral, Q8H  ceFAZolin  2,000 mg 15 mL, Slow IV Push, Q8H (variable)  Chlorhexidine gluconate 0.12% ORAL RINSE 15 mL repack  15 mL, Oral Mucosa, Q12H  Docusate sodium 100 mg cap  100 mg 1 cap, Oral, BID  Senna-docusate sodium 8.6-50 mg tab  2 tab, Oral, Q Bedtime  Continuous: (1)  Dextrose 5%-0.9% NaCl-KCl 20 mEq 1,000 mL  1,000 mL, IV, 80 mL/hr  PRN: (9)  Acetaminophen 325 mg tab  650 mg 2 tab, Oral, Q4H  Benzocaine-menthol 15-3.6 mg lozenge  1 lozenge, Oral Mucosa, Q4H  Bisacodyl 10 mg suppos  10 mg 1 suppository, Rectal, Daily  Calcium carbonate 500 mg chew tab [Ca 200 mg]  1,000 mg 2 tab, Chewed, Q8H  HYDROmorphone PCA 0.2 mg/mL-NS  6 mg 30 mL, IV PCA, As Directed PRN  Milk of magnesia 8% 30 mL oral susp UD  30 mL, Oral,  Daily  Naloxone 0.4 mg/1 mL inj SDV  0.2 mg 0.5 mL, IV Push, On Call  Ondansetron 4 mg disintegrating tab  4 mg 1 tab, Oral, Q6H  Ondansetron 4 mg/2 mL inj SDV  4 mg 2 mL, Slow IV Push, Q6H        Objective   VS/Measurements   Vital Signs Assessments   10/29/18 17:15 Temperature - VS 36.8 deg_C  Normal     Temperature Source - VS Oral     Heart/Pulse Rate 85  Normal     Respiration Rate 16 breaths/min  Normal     SpO2 100 %  Normal     NIBP Systolic 146  HI     NIBP Diastolic 88  Normal    10/29/18 13:00 Heart/Pulse Rate 70 beats/min  Normal     SpO2 97 %  Normal    10/29/18 13:00 Respiration Rate 16 breaths/min  Normal    10/29/18 13:00 Temperature - VS 36.5 deg_C  Normal     Temperature Source - VS Oral    10/29/18 13:00 NIBP Systolic 147 mm Hg  HI     NIBP Diastolic 84 mm Hg  Normal     NIBP MAP Manual Entry 105    10/29/18 12:40 Respiratory Rate ANES 10     ETCO2 ANES 32    10/29/18 12:38 NIBP Systolic ANES 133 mm Hg mm Hg     NIBP Diastolic ANES 81 mm Hg mm Hg     NIBP Mean ANES 96 mm Hg mm Hg    10/29/18 12:25 Temperature - VS 36.5 deg_C  Normal     Temperature Source - VS Temporal     Heart/Pulse Rate 71  Normal     Pulse Source Monitor     Respiration Rate 17 breaths/min  HI     SpO2 97 %  Normal     NIBP Systolic 119  Normal     NIBP Diastolic 74  Normal     NIBP MAP 88  Normal     ETCO2 28    10/29/18 12:10 Heart/Pulse Rate 82  Normal     Pulse Source Monitor     Respiration Rate 20 breaths/min  HI     SpO2 98 %  Normal     NIBP Systolic 129  Normal     NIBP Diastolic 77  Normal     NIBP MAP 94  Normal     ETCO2 33    10/29/18 11:55 Heart/Pulse Rate 70  Normal     Pulse Source Monitor     Respiration Rate 17 breaths/min  HI     SpO2 96 %  Normal     NIBP Systolic 128  Normal     NIBP Diastolic 76  Normal     NIBP MAP 90  Normal    10/29/18 11:40 Heart/Pulse Rate 76  Normal     Pulse Source Monitor     Respiration Rate 18 breaths/min  HI     SpO2 99 %  Normal     NIBP Systolic 117  Normal     NIBP Diastolic  63  Normal     NIBP MAP 78  Normal    10/29/18 11:25 Temperature - VS 36.0 deg_C  Normal     Temperature Source - VS Temporal     Heart/Pulse Rate 86  Normal     Pulse Source Monitor     Respiration Rate 20 breaths/min  HI     SpO2 98 %  Normal     Pulse Ox Probe Site Left Hand     NIBP Systolic 132  Normal     NIBP Diastolic 88  Normal     NIBP Source Right Arm     NIBP   Normal     SN Alarms Set and Appropriate Patient Alarms Set And Appropriate For Patient    10/29/18 11:23 Heart Rate ANES 88     Respiratory Rate ANES 12     SPO2 ANES 99 % %     NIBP Systolic ANES 125 mm Hg mm Hg     NIBP Diastolic ANES 73 mm Hg mm Hg     NIBP Mean ANES 91 mm Hg mm Hg    10/29/18 11:22 Heart Rate ANES 89     Respiratory Rate ANES 14     SPO2 ANES 99 % %    10/29/18 11:21 Heart Rate ANES 90     Respiratory Rate ANES 15     SPO2 ANES 99 % %    10/29/18 11:20 Heart Rate ANES 92     Respiratory Rate ANES 18     SPO2 ANES 98 % %     ETCO2 ANES 11    10/29/18 11:19 Heart Rate ANES 89    10/29/18 11:18 Heart Rate ANES 90     NIBP Systolic ANES 118 mm Hg mm Hg     NIBP Diastolic ANES 71 mm Hg mm Hg     NIBP Mean ANES 88 mm Hg mm Hg    10/29/18 11:17 Heart Rate ANES 89    10/29/18 11:16 Heart Rate ANES 85     Respiratory Rate ANES 19    10/29/18 11:15 Heart Rate ANES 82     Respiratory Rate ANES 14     SPO2 ANES 100 % %     ETCO2 ANES 37    10/29/18 11:14 Heart Rate ANES 80    10/29/18 11:13 NIBP Systolic ANES 122 mm Hg mm Hg     NIBP Diastolic ANES 75 mm Hg mm Hg     NIBP Mean ANES 90 mm Hg mm Hg    10/29/18 11:11 Respiratory Rate ANES 20    10/29/18 11:10 Heart Rate ANES 79     Respiratory Rate ANES 20     SPO2 ANES 99 % %     ETCO2 ANES 39    10/29/18 11:09 Respiratory Rate ANES 22    10/29/18 11:08 Respiratory Rate ANES 21     NIBP Systolic ANES 126 mm Hg mm Hg     NIBP Diastolic ANES 78 mm Hg mm Hg     NIBP Mean ANES 92 mm Hg mm Hg    10/29/18 11:07 Respiratory Rate ANES 18    10/29/18 11:06 Respiratory Rate ANES 22     10/29/18 11:05 Heart Rate ANES 83     Respiratory Rate ANES 19     SPO2 ANES 99 % %     ETCO2 ANES 41    10/29/18 11:04 Respiratory Rate ANES 16    10/29/18 11:03 Respiratory Rate ANES 21     NIBP Systolic ANES 125 mm Hg mm Hg     NIBP Diastolic ANES 75 mm Hg mm Hg     NIBP Mean ANES 90 mm Hg mm Hg    10/29/18 11:02 Respiratory Rate ANES 16    10/29/18 11:01 Respiratory Rate ANES 12    10/29/18 11:00 Heart Rate ANES 86     Respiratory Rate ANES 6     SPO2 ANES 99 % %     ETCO2 ANES 25     Temperature Esophogeal ANES 36 deg_C deg_C    10/29/18 10:58 NIBP Systolic ANES 119 mm Hg mm Hg     NIBP Diastolic ANES 75 mm Hg mm Hg     NIBP Mean ANES 87 mm Hg mm Hg    10/29/18 10:55 Heart Rate ANES 91     Respiratory Rate ANES 8     SPO2 ANES 99 % %     ETCO2 ANES 39     Temperature Esophogeal ANES 36.08 deg_C deg_C    10/29/18 10:53 NIBP Systolic ANES 112 mm Hg mm Hg     NIBP Diastolic ANES 65 mm Hg mm Hg     NIBP Mean ANES 80 mm Hg mm Hg    10/29/18 10:51 Heart Rate ANES 96    10/29/18 10:50 Heart Rate ANES 99     Respiratory Rate ANES 8     SPO2 ANES 98 % %     ETCO2 ANES 37     Temperature Esophogeal ANES 36.05 deg_C deg_C    10/29/18 10:49 Heart Rate ANES 84    10/29/18 10:48 NIBP Systolic ANES 101 mm Hg mm Hg     NIBP Diastolic ANES 63 mm Hg mm Hg     NIBP Mean ANES 75 mm Hg mm Hg    10/29/18 10:45 Heart Rate ANES 84     Respiratory Rate ANES 14     SPO2 ANES 99 % %     ETCO2 ANES 30     Temperature Esophogeal ANES 36.07 deg_C deg_C    10/29/18 10:43 NIBP Systolic ANES 109 mm Hg mm Hg     NIBP Diastolic ANES 67 mm Hg mm Hg     NIBP Mean ANES 80 mm Hg mm Hg    10/29/18 10:40 Heart Rate ANES 83     Respiratory Rate ANES 14     SPO2 ANES 99 % %     ETCO2 ANES 30     Temperature Esophogeal ANES 36.06 deg_C deg_C    10/29/18 10:38 NIBP Systolic ANES 111 mm Hg mm Hg     NIBP Diastolic ANES 69 mm Hg mm Hg     NIBP Mean ANES 81 mm Hg mm Hg    10/29/18 10:35 Heart Rate ANES 82     Respiratory Rate ANES 14     SPO2 ANES 99 % %      ETCO2 ANES 30     Temperature Esophogeal ANES 36.01 deg_C deg_C    10/29/18 10:33 NIBP Systolic ANES 110 mm Hg mm Hg     NIBP Diastolic ANES 66 mm Hg mm Hg     NIBP Mean ANES 79 mm Hg mm Hg    10/29/18 10:30 Heart Rate ANES 80     Respiratory Rate ANES 14     SPO2 ANES 99 % %     ETCO2 ANES 31     Temperature Esophogeal ANES 35.99 deg_C deg_C    10/29/18 10:29 Heart Rate ANES 89    10/29/18 10:28 Heart Rate ANES 88     NIBP Systolic ANES 113 mm Hg mm Hg     NIBP Diastolic ANES 69 mm Hg mm Hg     NIBP Mean ANES 83 mm Hg mm Hg    10/29/18 10:27 Heart Rate ANES 84    10/29/18 10:25 Heart Rate ANES 78     Respiratory Rate ANES 12     SPO2 ANES 99 % %     ETCO2 ANES 32     Temperature Esophogeal ANES 35.95 deg_C deg_C    10/29/18 10:23 NIBP Systolic ANES 109 mm Hg mm Hg     NIBP Diastolic ANES 65 mm Hg mm Hg     NIBP Mean ANES 79 mm Hg mm Hg    10/29/18 10:20 Heart Rate ANES 77     Respiratory Rate ANES 12     SPO2 ANES 99 % %     ETCO2 ANES 32     Temperature Esophogeal ANES 35.91 deg_C deg_C    10/29/18 10:18 NIBP Systolic ANES 107 mm Hg mm Hg     NIBP Diastolic ANES 66 mm Hg mm Hg     NIBP Mean ANES 79 mm Hg mm Hg    10/29/18 10:15 Heart Rate ANES 77     Respiratory Rate ANES 12     SPO2 ANES 99 % %     ETCO2 ANES 32     Temperature Esophogeal ANES 35.85 deg_C deg_C    10/29/18 10:13 NIBP Systolic ANES 104 mm Hg mm Hg     NIBP Diastolic ANES 64 mm Hg mm Hg     NIBP Mean ANES 77 mm Hg mm Hg    10/29/18 10:12 Heart Rate ANES 87    10/29/18 10:11 Heart Rate ANES 85    10/29/18 10:10 Heart Rate ANES 82     Respiratory Rate ANES 12     SPO2 ANES 99 % %     ETCO2 ANES 31     Temperature Esophogeal ANES 35.82 deg_C deg_C    10/29/18 10:09 Heart Rate ANES 80    10/29/18 10:08 Heart Rate ANES 78     NIBP Systolic ANES 114 mm Hg mm Hg     NIBP Diastolic ANES 72 mm Hg mm Hg     NIBP Mean ANES 88 mm Hg mm Hg    10/29/18 10:05 Heart Rate ANES 73     Respiratory Rate ANES 12     SPO2 ANES 99 % %     ETCO2 ANES 31     Temperature  Esophogeal ANES 35.77 deg_C deg_C    10/29/18 10:04 Heart Rate ANES 89    10/29/18 10:03 Heart Rate ANES 88     NIBP Systolic ANES 119 mm Hg mm Hg     NIBP Diastolic ANES 78 mm Hg mm Hg     NIBP Mean ANES 93 mm Hg mm Hg    10/29/18 10:02 Heart Rate ANES 84    10/29/18 10:01 Heart Rate ANES 82    10/29/18 10:00 Heart Rate ANES 80     Respiratory Rate ANES 12     SPO2 ANES 98 % %     ETCO2 ANES 31     Temperature Esophogeal ANES 35.76 deg_C deg_C    10/29/18 09:59 Heart Rate ANES 75    10/29/18 09:58 NIBP Systolic ANES 105 mm Hg mm Hg     NIBP Diastolic ANES 67 mm Hg mm Hg     NIBP Mean ANES 80 mm Hg mm Hg    10/29/18 09:55 Heart Rate ANES 74     Respiratory Rate ANES 12     SPO2 ANES 98 % %     ETCO2 ANES 32     Temperature Esophogeal ANES 35.7 deg_C deg_C    10/29/18 09:53 NIBP Systolic ANES 98 mm Hg mm Hg     NIBP Diastolic ANES 65 mm Hg mm Hg     NIBP Mean ANES 77 mm Hg mm Hg    10/29/18 09:50 Heart Rate ANES 77     Respiratory Rate ANES 12     SPO2 ANES 99 % %     ETCO2 ANES 31     Temperature Esophogeal ANES 35.7 deg_C deg_C    10/29/18 09:48 NIBP Systolic ANES 98 mm Hg mm Hg     NIBP Diastolic ANES 62 mm Hg mm Hg     NIBP Mean ANES 74 mm Hg mm Hg    10/29/18 09:45 Heart Rate ANES 75     Respiratory Rate ANES 12     SPO2 ANES 99 % %     ETCO2 ANES 30     Temperature Esophogeal ANES 35.65 deg_C deg_C    10/29/18 09:43 NIBP Systolic ANES 101 mm Hg mm Hg     NIBP Diastolic ANES 65 mm Hg mm Hg     NIBP Mean ANES 76 mm Hg mm Hg    10/29/18 09:40 Heart Rate ANES 74     Respiratory Rate ANES 12     SPO2 ANES 99 % %     ETCO2 ANES 30     Temperature Esophogeal ANES 35.63 deg_C deg_C    10/29/18 09:38 NIBP Systolic ANES 97 mm Hg mm Hg     NIBP Diastolic ANES 59 mm Hg mm Hg     NIBP Mean ANES 72 mm Hg mm Hg    10/29/18 09:35 Heart Rate ANES 77     Respiratory Rate ANES 12     SPO2 ANES 99 % %     ETCO2 ANES 30     Temperature Esophogeal ANES 35.59 deg_C deg_C    10/29/18 09:33 NIBP Systolic ANES 96 mm Hg mm Hg     NIBP  Diastolic ANES 62 mm Hg mm Hg     NIBP Mean ANES 73 mm Hg mm Hg    10/29/18 09:30 Heart Rate ANES 72     Respiratory Rate ANES 12     SPO2 ANES 99 % %     ETCO2 ANES 29     Temperature Esophogeal ANES 35.58 deg_C deg_C    10/29/18 09:28 NIBP Systolic ANES 98 mm Hg mm Hg     NIBP Diastolic ANES 57 mm Hg mm Hg     NIBP Mean ANES 71 mm Hg mm Hg    10/29/18 09:25 Heart Rate ANES 72     Respiratory Rate ANES 12     SPO2 ANES 99 % %     ETCO2 ANES 29     Temperature Esophogeal ANES 35.54 deg_C deg_C    10/29/18 09:23 NIBP Systolic ANES 92 mm Hg mm Hg     NIBP Diastolic ANES 57 mm Hg mm Hg     NIBP Mean ANES 70 mm Hg mm Hg    10/29/18 09:20 Heart Rate ANES 72     Respiratory Rate ANES 12     SPO2 ANES 99 % %     ETCO2 ANES 28     Temperature Esophogeal ANES 35.53 deg_C deg_C    10/29/18 09:18 NIBP Systolic ANES 99 mm Hg mm Hg     NIBP Diastolic ANES 59 mm Hg mm Hg     NIBP Mean ANES 70 mm Hg mm Hg    10/29/18 09:15 Heart Rate ANES 72     Respiratory Rate ANES 12     SPO2 ANES 99 % %     ETCO2 ANES 28     Temperature Esophogeal ANES 35.51 deg_C deg_C    10/29/18 09:13 NIBP Systolic ANES 92 mm Hg mm Hg     NIBP Diastolic ANES 58 mm Hg mm Hg     NIBP Mean ANES 70 mm Hg mm Hg    10/29/18 09:12 ETCO2 ANES 30    10/29/18 09:11 ETCO2 ANES 30    10/29/18 09:10 Heart Rate ANES 73     Respiratory Rate ANES 12     SPO2 ANES 99 % %     ETCO2 ANES 30     Temperature Esophogeal ANES 35.5 deg_C deg_C    10/29/18 09:09 ETCO2 ANES 30    10/29/18 09:08 NIBP Systolic ANES 112 mm Hg mm Hg     NIBP Diastolic ANES 65 mm Hg mm Hg     NIBP Mean ANES 79 mm Hg mm Hg     ETCO2 ANES 30    10/29/18 09:07 ETCO2 ANES 30    10/29/18 09:06 Heart Rate ANES 76     ETCO2 ANES 30    10/29/18 09:05 Heart Rate ANES 83     Respiratory Rate ANES 12     SPO2 ANES 99 % %     ETCO2 ANES 30     Temperature Esophogeal ANES 35.46 deg_C deg_C    10/29/18 09:04 Heart Rate ANES 88     ETCO2 ANES 29    10/29/18 09:03 Heart Rate ANES 87     NIBP Systolic ANES 118 mm Hg mm  Hg     NIBP Diastolic ANES 69 mm Hg mm Hg     NIBP Mean ANES 84 mm Hg mm Hg     ETCO2 ANES 29    10/29/18 09:02 Heart Rate ANES 85     ETCO2 ANES 29    10/29/18 09:01 Heart Rate ANES 88     ETCO2 ANES 29    10/29/18 09:00 Heart Rate ANES 80     Respiratory Rate ANES 12     SPO2 ANES 99 % %     ETCO2 ANES 29     Temperature Esophogeal ANES 35.47 deg_C deg_C    10/29/18 08:59 Heart Rate ANES 82     ETCO2 ANES 29    10/29/18 08:58 Heart Rate ANES 84     NIBP Systolic ANES 121 mm Hg mm Hg     NIBP Diastolic ANES 72 mm Hg mm Hg     NIBP Mean ANES 87 mm Hg mm Hg     ETCO2 ANES 29    10/29/18 08:57 Heart Rate ANES 81     ETCO2 ANES 29    10/29/18 08:56 Heart Rate ANES 82     ETCO2 ANES 29    10/29/18 08:55 Heart Rate ANES 80     Respiratory Rate ANES 12     SPO2 ANES 99 % %     ETCO2 ANES 29     Temperature Esophogeal ANES 35.48 deg_C deg_C    10/29/18 08:54 Heart Rate ANES 77     ETCO2 ANES 29    10/29/18 08:53 Heart Rate ANES 74     NIBP Systolic ANES 107 mm Hg mm Hg     NIBP Diastolic ANES 69 mm Hg mm Hg     NIBP Mean ANES 82 mm Hg mm Hg    10/29/18 08:52 ETCO2 ANES 29    10/29/18 08:51 ETCO2 ANES 30    10/29/18 08:50 Heart Rate ANES 71     Respiratory Rate ANES 12     SPO2 ANES 99 % %     ETCO2 ANES 30     Temperature Esophogeal ANES 35.47 deg_C deg_C    10/29/18 08:49 ETCO2 ANES 30    10/29/18 08:48 NIBP Systolic ANES 93 mm Hg mm Hg     NIBP Diastolic ANES 61 mm Hg mm Hg     NIBP Mean ANES 71 mm Hg mm Hg     ETCO2 ANES 30    10/29/18 08:46 ETCO2 ANES 29    10/29/18 08:45 Heart Rate ANES 73     Respiratory Rate ANES 12     SPO2 ANES 99 % %     ETCO2 ANES 29     Temperature Esophogeal ANES 35.53 deg_C deg_C    10/29/18 08:44 ETCO2 ANES 29    10/29/18 08:43 NIBP Systolic ANES 92 mm Hg mm Hg     NIBP Diastolic ANES 58 mm Hg mm Hg     NIBP Mean ANES 69 mm Hg mm Hg     ETCO2 ANES 29    10/29/18 08:42 ETCO2 ANES 29    10/29/18 08:41 ETCO2 ANES 29    10/29/18 08:40 Heart Rate ANES 71     Respiratory Rate ANES 12     SPO2 ANES  99 % %     ETCO2 ANES 29     Temperature Esophogeal ANES 35.54 deg_C deg_C    10/29/18 08:39 ETCO2 ANES 29    10/29/18 08:38 NIBP Systolic ANES 95 mm Hg mm Hg     NIBP Diastolic ANES 57 mm Hg mm Hg     NIBP Mean ANES 69 mm Hg mm Hg    10/29/18 08:37 ETCO2 ANES 30    10/29/18 08:35 Heart Rate ANES 72     Respiratory Rate ANES 12     SPO2 ANES 99 % %     ETCO2 ANES 28     Temperature Esophogeal ANES 35.58 deg_C deg_C    10/29/18 08:34 ETCO2 ANES 30    10/29/18 08:33 NIBP Systolic ANES 99 mm Hg mm Hg     NIBP Diastolic ANES 58 mm Hg mm Hg     NIBP Mean ANES 72 mm Hg mm Hg     ETCO2 ANES 30    10/29/18 08:32 ETCO2 ANES 30    10/29/18 08:31 ETCO2 ANES 30    10/29/18 08:30 Heart Rate ANES 72     Respiratory Rate ANES 14     SPO2 ANES 99 % %     ETCO2 ANES 30     Temperature Esophogeal ANES 35.62 deg_C deg_C    10/29/18 08:29 ETCO2 ANES 30    10/29/18 08:28 NIBP Systolic ANES 102 mm Hg mm Hg     NIBP Diastolic ANES 59 mm Hg mm Hg     NIBP Mean ANES 72 mm Hg mm Hg     ETCO2 ANES 30    10/29/18 08:27 ETCO2 ANES 30    10/29/18 08:26 ETCO2 ANES 30    10/29/18 08:25 Heart Rate ANES 76     Respiratory Rate ANES 14     SPO2 ANES 99 % %     ETCO2 ANES 30     Temperature Esophogeal ANES 35.66 deg_C deg_C    10/29/18 08:24 ETCO2 ANES 30    10/29/18 08:23 NIBP Systolic ANES 105 mm Hg mm Hg     NIBP Diastolic ANES 67 mm Hg mm Hg     NIBP Mean ANES 78 mm Hg mm Hg    10/29/18 08:22 ETCO2 ANES 29    10/29/18 08:21 ETCO2 ANES 29    10/29/18 08:20 Heart Rate ANES 72     Respiratory Rate ANES 14     SPO2 ANES 99 % %     ETCO2 ANES 29     Temperature Esophogeal ANES 35.95 deg_C deg_C    10/29/18 08:19 ETCO2 ANES 30    10/29/18 08:18 NIBP Systolic ANES 102 mm Hg mm Hg     NIBP Diastolic ANES 60 mm Hg mm Hg     NIBP Mean ANES 72 mm Hg mm Hg     ETCO2 ANES 30    10/29/18 08:17 ETCO2 ANES 30    10/29/18 08:16 ETCO2 ANES 31    10/29/18 08:15 Heart Rate ANES 70     Respiratory Rate ANES 14     SPO2 ANES 99 % %     ETCO2 ANES 31     Temperature  Esophogeal ANES 36.03 deg_C deg_C    10/29/18 08:14 ETCO2 ANES 31    10/29/18 08:13 NIBP Systolic ANES 100 mm Hg mm Hg     NIBP Diastolic ANES 65 mm Hg mm Hg     NIBP Mean ANES 77 mm Hg mm Hg     ETCO2 ANES 31    10/29/18 08:12 ETCO2 ANES 30    10/29/18 08:11 ETCO2 ANES 30    10/29/18 08:10 Heart Rate ANES 71     Respiratory Rate ANES 14     SPO2 ANES 99 % %     ETCO2 ANES 30     Temperature Esophogeal ANES 36.09 deg_C deg_C    10/29/18 08:09 ETCO2 ANES 30    10/29/18 08:08 NIBP Systolic ANES 99 mm Hg mm Hg     NIBP Diastolic ANES 69 mm Hg mm Hg     NIBP Mean ANES 79 mm Hg mm Hg     ETCO2 ANES 30    10/29/18 08:07 ETCO2 ANES 30    10/29/18 08:05 Heart Rate ANES 71     Respiratory Rate ANES 14     SPO2 ANES 99 % %     ETCO2 ANES 30     Temperature Esophogeal ANES 36.09 deg_C deg_C    10/29/18 08:03 NIBP Systolic ANES 102 mm Hg mm Hg     NIBP Diastolic ANES 70 mm Hg mm Hg     NIBP Mean ANES 81 mm Hg mm Hg    10/29/18 08:00 Heart Rate ANES 85     Respiratory Rate ANES 14     SPO2 ANES 99 % %     ETCO2 ANES 34     Temperature Esophogeal ANES 36.13 deg_C deg_C    10/29/18 07:59 Heart Rate ANES 84    10/29/18 07:58 NIBP Systolic ANES 117 mm Hg mm Hg     NIBP Diastolic ANES 82 mm Hg mm Hg     NIBP Mean ANES 92 mm Hg mm Hg    10/29/18 07:55 Heart Rate ANES 87     Respiratory Rate ANES 14     SPO2 ANES 99 % %     NIBP Systolic ANES 123 mm Hg mm Hg     NIBP Diastolic ANES 77 mm Hg mm Hg     NIBP Mean ANES 91 mm Hg mm Hg     ETCO2 ANES 35    10/29/18 07:53 Heart Rate ANES 81     SPO2 ANES 99 % %    10/29/18 07:52 Heart Rate ANES 76     Respiratory Rate ANES 14     SPO2 ANES 99 % %     ETCO2 ANES 37    10/29/18 07:51 Heart Rate ANES 72     Respiratory Rate ANES 14     SPO2 ANES 99 % %    10/29/18 07:50 Heart Rate ANES 74     Respiratory Rate ANES 14     SPO2 ANES 99 % %     ETCO2 ANES 37    10/29/18 07:49 Heart Rate ANES 75     Respiratory Rate ANES 14     SPO2 ANES 99 % %     ETCO2 ANES 37    10/29/18 07:48 Respiratory Rate  ANES 14     SPO2 ANES 99 % %     NIBP Systolic ANES 142 mm Hg mm Hg     NIBP Diastolic ANES 92 mm Hg mm Hg     NIBP Mean ANES 106 mm Hg mm Hg     ETCO2 ANES 38    10/29/18 07:47 Respiratory Rate ANES 14     ETCO2 ANES 37    10/29/18 07:46 Respiratory Rate ANES 14     ETCO2 ANES 39    10/29/18 07:45 Heart Rate ANES 81     Respiratory Rate ANES 14 (Modified)    SPO2 ANES 99 % %     ETCO2 ANES 38 (Modified)   10/29/18 07:44 Respiratory Rate ANES 14     ETCO2 ANES 41    10/29/18 07:43 Respiratory Rate ANES 14     NIBP Systolic ANES 159 mm Hg mm Hg (Modified)    NIBP Diastolic ANES 102 mm Hg mm Hg (Modified)    NIBP Mean ANES 114 mm Hg mm Hg (Modified)    ETCO2 ANES 44    10/29/18 07:42 Heart Rate ANES 82     Respiratory Rate ANES 14     ETCO2 ANES 45    10/29/18 07:41 Heart Rate ANES 85     Respiratory Rate ANES 14     ETCO2 ANES 42    10/29/18 07:40 Heart Rate ANES 88     Respiratory Rate ANES 14 (Modified)    SPO2 ANES 99 % %     ETCO2 ANES 43 (Modified)   10/29/18 07:39 Heart Rate ANES 72     Respiratory Rate ANES 14    10/29/18 07:38 Heart Rate ANES 68     Respiratory Rate ANES 10     ETCO2 ANES 25    10/29/18 07:37 Heart Rate ANES 67     Respiratory Rate ANES 9     ETCO2 ANES 32    10/29/18 07:36 Respiratory Rate ANES 12     SPO2 ANES 100 % %     NIBP Systolic ANES 124 mm Hg mm Hg     NIBP Diastolic ANES 82 mm Hg mm Hg     NIBP Mean ANES 95 mm Hg mm Hg     ETCO2 ANES 21    10/29/18 07:35 Heart Rate ANES 68     Respiratory Rate ANES 15     SPO2 ANES 100 % % (Modified)    ETCO2 ANES 15    10/29/18 07:34 Heart Rate ANES 71     Respiratory Rate ANES 11     SPO2 ANES 100 % %     ETCO2 ANES 37    10/29/18 07:33 Heart Rate ANES 70     Respiratory Rate ANES 9     SPO2 ANES 100 % %     ETCO2 ANES 34    10/29/18 07:32 Heart Rate ANES 72     Respiratory Rate ANES 12     SPO2 ANES 100 % %     ETCO2 ANES 29    10/29/18 07:31 Heart Rate ANES 77     Respiratory Rate ANES 14     SPO2 ANES 100 % %     ETCO2 ANES 33    10/29/18  07:30 Heart Rate ANES 80     Respiratory Rate ANES 12     SPO2 ANES 100 % %     ETCO2 ANES 19    10/29/18 07:29 Heart Rate ANES 75     Respiratory Rate ANES 11     SPO2 ANES 100 % %     ETCO2 ANES 11    10/29/18 07:28 Heart Rate ANES 74     Respiratory Rate ANES 9     SPO2 ANES 99 % %    10/29/18 06:23 Temperature - VS 36.1 deg_C  Normal     Temperature Source - VS Oral     Heart/Pulse Rate 75  Normal     Pulse Source Monitor     Respiration Rate 19 breaths/min  HI     SpO2 96 %  Normal     Pulse Ox Probe Site LEFT INDEX FINGER     NIBP Systolic 125  Normal     NIBP Diastolic 96  HI     NIBP Source Right Arm     NIBP   HI       ,      Vitals between:   28-OCT-2018 21:27:48   TO   29-OCT-2018 21:27:48                   LAST RESULT MINIMUM MAXIMUM  Temperature 36.8 36.0 36.8  Heart Rate 85 70 86  Respiratory Rate 16 16 20  NISBP           146 117 147  NIDBP           88 63 96  NIMBP           88 78 106  SpO2                    100 96 100              Electronically Signed On 10/29/2018 21:28  __________________________________________________   AKASH MCDONALD

## 2023-05-12 ENCOUNTER — NURSE ONLY (OUTPATIENT)
Dept: LAB | Age: 53
End: 2023-05-12

## 2023-05-12 DIAGNOSIS — Z00.00 ROUTINE GENERAL MEDICAL EXAMINATION AT HEALTH CARE FACILITY: ICD-10-CM

## 2023-05-12 LAB
ALBUMIN SERPL BCG-MCNC: 4.9 G/DL (ref 3.5–5.1)
ALP SERPL-CCNC: 66 U/L (ref 38–126)
ALT SERPL W/O P-5'-P-CCNC: 32 U/L (ref 11–66)
ANION GAP SERPL CALC-SCNC: 13 MEQ/L (ref 8–16)
AST SERPL-CCNC: 25 U/L (ref 5–40)
BILIRUB SERPL-MCNC: 0.5 MG/DL (ref 0.3–1.2)
BUN SERPL-MCNC: 22 MG/DL (ref 7–22)
CALCIUM SERPL-MCNC: 9.7 MG/DL (ref 8.5–10.5)
CHLORIDE SERPL-SCNC: 105 MEQ/L (ref 98–111)
CHOLEST SERPL-MCNC: 152 MG/DL (ref 100–199)
CO2 SERPL-SCNC: 22 MEQ/L (ref 23–33)
CREAT SERPL-MCNC: 1.1 MG/DL (ref 0.4–1.2)
DEPRECATED RDW RBC AUTO: 41.8 FL (ref 35–45)
ERYTHROCYTE [DISTWIDTH] IN BLOOD BY AUTOMATED COUNT: 12.6 % (ref 11.5–14.5)
GFR SERPL CREATININE-BSD FRML MDRD: > 60 ML/MIN/1.73M2
GLUCOSE FASTING: 96 MG/DL (ref 70–108)
HCT VFR BLD AUTO: 43.7 % (ref 42–52)
HDLC SERPL-MCNC: 36 MG/DL
HGB BLD-MCNC: 14.5 GM/DL (ref 14–18)
LDLC SERPL CALC-MCNC: 89 MG/DL
MCH RBC QN AUTO: 30.1 PG (ref 26–33)
MCHC RBC AUTO-ENTMCNC: 33.2 GM/DL (ref 32.2–35.5)
MCV RBC AUTO: 90.9 FL (ref 80–94)
PLATELET # BLD AUTO: 289 THOU/MM3 (ref 130–400)
PMV BLD AUTO: 10.5 FL (ref 9.4–12.4)
POTASSIUM SERPL-SCNC: 4.8 MEQ/L (ref 3.5–5.2)
PROT SERPL-MCNC: 7 G/DL (ref 6.1–8)
PSA SERPL-MCNC: 1.9 NG/ML (ref 0–1)
RBC # BLD AUTO: 4.81 MILL/MM3 (ref 4.7–6.1)
SODIUM SERPL-SCNC: 140 MEQ/L (ref 135–145)
TRIGL SERPL-MCNC: 134 MG/DL (ref 0–199)
TSH SERPL DL<=0.005 MIU/L-ACNC: 1.6 UIU/ML (ref 0.4–4.2)
WBC # BLD AUTO: 5.3 THOU/MM3 (ref 4.8–10.8)

## 2023-06-03 DIAGNOSIS — E78.00 PURE HYPERCHOLESTEROLEMIA: ICD-10-CM

## 2023-06-05 RX ORDER — ROSUVASTATIN CALCIUM 20 MG/1
20 TABLET, COATED ORAL DAILY
Qty: 90 TABLET | Refills: 5 | Status: SHIPPED | OUTPATIENT
Start: 2023-06-05

## 2023-09-19 ENCOUNTER — TELEPHONE (OUTPATIENT)
Dept: FAMILY MEDICINE CLINIC | Age: 53
End: 2023-09-19

## 2023-09-19 NOTE — TELEPHONE ENCOUNTER
Pt called stating that all summer he has been having head and nasal congestion but in the past 4-5 days it has been worse.  He has been taking zyrtec, Flonase, and allegra  Pt asking what else he can try  or should do

## 2023-09-20 ENCOUNTER — OFFICE VISIT (OUTPATIENT)
Dept: FAMILY MEDICINE CLINIC | Age: 53
End: 2023-09-20
Payer: COMMERCIAL

## 2023-09-20 VITALS
OXYGEN SATURATION: 97 % | SYSTOLIC BLOOD PRESSURE: 136 MMHG | HEIGHT: 73 IN | TEMPERATURE: 97.6 F | DIASTOLIC BLOOD PRESSURE: 76 MMHG | WEIGHT: 282 LBS | BODY MASS INDEX: 37.37 KG/M2 | HEART RATE: 70 BPM | RESPIRATION RATE: 16 BRPM

## 2023-09-20 DIAGNOSIS — R63.5 WEIGHT GAIN: ICD-10-CM

## 2023-09-20 DIAGNOSIS — J32.9 RECURRENT SINUSITIS: Primary | ICD-10-CM

## 2023-09-20 DIAGNOSIS — I10 HYPERTENSION, UNSPECIFIED TYPE: ICD-10-CM

## 2023-09-20 PROCEDURE — 96372 THER/PROPH/DIAG INJ SC/IM: CPT | Performed by: FAMILY MEDICINE

## 2023-09-20 PROCEDURE — 3075F SYST BP GE 130 - 139MM HG: CPT | Performed by: FAMILY MEDICINE

## 2023-09-20 PROCEDURE — 3078F DIAST BP <80 MM HG: CPT | Performed by: FAMILY MEDICINE

## 2023-09-20 PROCEDURE — 99214 OFFICE O/P EST MOD 30 MIN: CPT | Performed by: FAMILY MEDICINE

## 2023-09-20 RX ORDER — METHYLPREDNISOLONE 4 MG/1
TABLET ORAL
Qty: 1 KIT | Refills: 0 | Status: SHIPPED | OUTPATIENT
Start: 2023-09-20 | End: 2023-09-26

## 2023-09-20 RX ORDER — TRIAMCINOLONE ACETONIDE 40 MG/ML
40 INJECTION, SUSPENSION INTRA-ARTICULAR; INTRAMUSCULAR ONCE
Status: COMPLETED | OUTPATIENT
Start: 2023-09-20 | End: 2023-09-20

## 2023-09-20 RX ORDER — LISINOPRIL 20 MG/1
20 TABLET ORAL DAILY
Qty: 90 TABLET | Refills: 1 | Status: SHIPPED | OUTPATIENT
Start: 2023-09-20

## 2023-09-20 RX ADMIN — TRIAMCINOLONE ACETONIDE 40 MG: 40 INJECTION, SUSPENSION INTRA-ARTICULAR; INTRAMUSCULAR at 16:46

## 2023-09-20 NOTE — PROGRESS NOTES
[facility]  [address] [facility]  Dept: [phone]  Dept Fax: [phone]  Loc: [phone]    Juno Lam is a 48 y.o. male who presents today for:  Chief Complaint   Patient presents with    Congestion           HPI:     HPI  Here for chronic congestion on and off for years spring and fall. This week was very bad. He is missing work. Nasal passages wheezing at night. Frontal facial pressure, itchy ears, PND, SOB with exertion. Tried:  zyrtec, allegra, flonase, zycam, saline flushes. All with minimal help. History of sinus surgery 2010, deviated septum and rhinoplasty. He has also been evaluated by allergist in the past and only found to have allergy to dust mites and dog and cat. BP has been up with pain radiating to the left shoulder blade. Weight gain and heavy breathing. Wears a mouth guard to help snoring. Reviewed chart forpast medical history , surgical history , allergies, social history , family history and medications. Health Maintenance   Topic Date Due    Hepatitis B vaccine (1 of 3 - 3-dose series) Never done    Hepatitis C screen  Never done    Flu vaccine (1) 08/01/2023    Shingles vaccine (1 of 2) 05/09/2024 (Originally 2/20/2022)    COVID-19 Vaccine (3 - Pfizer series) 05/09/2028 (Originally 6/8/2021)    Depression Screen  05/09/2024    Lipids  05/12/2024    DTaP/Tdap/Td vaccine (2 - Td or Tdap) 10/22/2025    Diabetes screen  05/12/2026    Colorectal Cancer Screen  02/25/2032    HIV screen  Completed    Hepatitis A vaccine  Aged Out    Hib vaccine  Aged Out    Meningococcal (ACWY) vaccine  Aged Out    Pneumococcal 0-64 years Vaccine  Aged Out       Subjective:      Constitutional:Negative for fever, chills, diaphoresis, activity change, appetite change and fatigue.    HENT: Negative for hearing loss, ear pain, congestion, sore throat, rhinorrhea, postnasal drip and ear

## 2023-09-20 NOTE — PROGRESS NOTES
Administrations This Visit       triamcinolone acetonide (KENALOG-40) injection 40 mg       Admin Date  09/20/2023  16:46 Action  Given Dose  40 mg Route  IntraMUSCular Site  Ventrogluteal Left  Administered By  Sami Edwards MA    Ordering Provider: Liliana Romero MD    NDC: 9214-4139-08    Lot#: [id number]    : [employer] (PRIMARY CARE)    Patient Supplied?: No                 Pt tolerated appropriately.

## 2023-11-27 ENCOUNTER — TELEPHONE (OUTPATIENT)
Dept: FAMILY MEDICINE CLINIC | Age: 53
End: 2023-11-27

## 2023-11-27 RX ORDER — AMOXICILLIN AND CLAVULANATE POTASSIUM 500; 125 MG/1; MG/1
1 TABLET, FILM COATED ORAL 3 TIMES DAILY
Qty: 30 TABLET | Refills: 0 | Status: SHIPPED | OUTPATIENT
Start: 2023-11-27 | End: 2023-12-07

## 2023-11-27 RX ORDER — PREDNISONE 20 MG/1
20 TABLET ORAL DAILY
Qty: 5 TABLET | Refills: 0 | Status: SHIPPED | OUTPATIENT
Start: 2023-11-27 | End: 2023-12-02

## 2023-11-27 NOTE — TELEPHONE ENCOUNTER
Rx sent    Push fluids  Tylenol or ibuprofen prn fever  flonase every morning 2 sprays each side and saline nasal spray 4-5 times daily    Follow up if not better in 1 week or if symptoms get worse.

## 2023-11-27 NOTE — TELEPHONE ENCOUNTER
Pt has sinus infection . Left side sinus pressure. Eye and ear pain. Sinus drainage. Tried OTC allergy medication. Nasal flush. Pharmacy [facility]. Seeing ent in 3 weeks.

## 2023-12-12 ENCOUNTER — OFFICE VISIT (OUTPATIENT)
Dept: ENT CLINIC | Age: 53
End: 2023-12-12
Payer: COMMERCIAL

## 2023-12-12 VITALS
HEART RATE: 73 BPM | WEIGHT: 279 LBS | RESPIRATION RATE: 20 BRPM | TEMPERATURE: 96.7 F | SYSTOLIC BLOOD PRESSURE: 132 MMHG | OXYGEN SATURATION: 97 % | DIASTOLIC BLOOD PRESSURE: 82 MMHG | BODY MASS INDEX: 36.98 KG/M2 | HEIGHT: 73 IN

## 2023-12-12 DIAGNOSIS — J34.89 CONCHA BULLOSA: ICD-10-CM

## 2023-12-12 DIAGNOSIS — R06.83 SNORING: ICD-10-CM

## 2023-12-12 DIAGNOSIS — R44.8 FACIAL PRESSURE: Primary | ICD-10-CM

## 2023-12-12 DIAGNOSIS — E66.01 SEVERE OBESITY (BMI 35.0-39.9) WITH COMORBIDITY (HCC): ICD-10-CM

## 2023-12-12 DIAGNOSIS — R09.A2 FOREIGN BODY SENSATION IN THROAT: ICD-10-CM

## 2023-12-12 DIAGNOSIS — R51.9 RHINOGENIC HEADACHE: ICD-10-CM

## 2023-12-12 DIAGNOSIS — J38.4 LARYNGEAL EDEMA DETERMINED BY LARYNGOSCOPY: ICD-10-CM

## 2023-12-12 PROCEDURE — 99204 OFFICE O/P NEW MOD 45 MIN: CPT | Performed by: OTOLARYNGOLOGY

## 2023-12-12 NOTE — PATIENT INSTRUCTIONS
Do not fill up your stomach for 3 hours before bedtime, with either food or drink. Elevate the head of the bed, perhaps with a foam wedge. May take PPI 3 hours before bedtime, or Pepcid at bedtime. Do not clear your throat. Mucinex DM and or Ludens wild cherry cough drops might help with minimizing the throat clearing. Do not routinely use menthol-containing cough drops. Check with your PCP or the prescribing physician regarding avoiding ACE/ARB BP meds. Suggest at least two months trial off such medications, since it can take that long for the side effects to subside. Do not stop the medication without instructions from the prescribing physician regarding any needed substitute blood pressure medication. The airway along the floor of the nose is overly patent and that tends to create a dry throat. Inferior turbinates were shortened along the bottom edges.

## 2023-12-12 NOTE — PROGRESS NOTES
[facility] [place] [facility]  [facility]  [address]   Dept: [phone]  Dept Fax: [phone]  Loc: [phone]    Nuno Thompson is a 48 y.o. male who was referred by Paulina Fenton MD for:  Chief Complaint   Patient presents with    New Patient     Patient is here as a new patient for chronic sinusitis. Patient had CT facial bones done on 01-04-23. Pt states sinus issues has been ongoing for a few years now. Mainly on the left side. Gets dry nose at night. A lot of post nasal drip causing throat irritation. Puffy eyes as well. Mikey Number HPI:     Nuno Thompson is a 48 y.o. male who presents today for evaluation of his sinus issues. He has a generalized stuffy sensation and what he describes as postnasal drip. Feels like there is something in his throat all the time, especially when he swallows. He has had previous sinus surgery. He had a sinus CT at the beginning of this year. There is no significant sinusitis. I reviewed the films. It appears that someone is trimmed the lower third of his inferior turbinates off, along the bottom edge creating overly patent airway passage. 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 The localized high airflow would cause thickened secretions which he is perceiving. Because most of his complaints are in his throat, we discussed a fiberoptic laryngoscopy and he concurred. History:      Allergies   Allergen Reactions    Dust Mite Mixed Allergen Ext [Mite (D. Farinae)]     Seasonal      Current Outpatient Medications   Medication Sig Dispense Refill    lisinopril (PRINIVIL;ZESTRIL) 20 MG tablet Take 1 tablet by mouth daily 90 tablet 1    rosuvastatin (CRESTOR) 20 MG tablet TAKE 1 TABLET BY MOUTH DAILY 90 tablet 5    fenofibrate (TRIGLIDE) 160 MG tablet TAKE 1 TABLET DAILY 90 tablet 5    omeprazole

## 2023-12-27 ENCOUNTER — TELEPHONE (OUTPATIENT)
Dept: FAMILY MEDICINE CLINIC | Age: 53
End: 2023-12-27

## 2023-12-27 RX ORDER — METOPROLOL SUCCINATE 50 MG/1
50 TABLET, EXTENDED RELEASE ORAL DAILY
Qty: 30 TABLET | Refills: 3 | Status: SHIPPED | OUTPATIENT
Start: 2023-12-27

## 2023-12-27 NOTE — TELEPHONE ENCOUNTER
Toprol sent to the pharmacy  Stop lisinopril   Will see how he is doing at the May appointment which needs schedule as wellness after 5/9/23.     Nurse BP check in 3 months    Call patient

## 2023-12-27 NOTE — TELEPHONE ENCOUNTER
Patient's wife came in stating her  went to Dr. Abby Douglas 12/12 and Abby Douglas wants to change his BP Medication thinking that could be causing the drainage he is having. Colltete Turner is thinking they are wanting to change the BP medication. She stated he has called into the office a couple of times leaving a message and hasn't heard anything back. Call Collette Turner at [phone] for any information.

## 2024-01-25 RX ORDER — METOPROLOL SUCCINATE 50 MG/1
50 TABLET, EXTENDED RELEASE ORAL DAILY
Qty: 90 TABLET | Refills: 1 | Status: SHIPPED | OUTPATIENT
Start: 2024-01-25

## 2024-02-16 ENCOUNTER — OFFICE VISIT (OUTPATIENT)
Dept: ENT CLINIC | Age: 54
End: 2024-02-16
Payer: COMMERCIAL

## 2024-02-16 VITALS
OXYGEN SATURATION: 96 % | RESPIRATION RATE: 20 BRPM | WEIGHT: 285.3 LBS | HEART RATE: 69 BPM | BODY MASS INDEX: 38.64 KG/M2 | TEMPERATURE: 97.3 F | SYSTOLIC BLOOD PRESSURE: 128 MMHG | DIASTOLIC BLOOD PRESSURE: 68 MMHG | HEIGHT: 72 IN

## 2024-02-16 DIAGNOSIS — R09.A2 FOREIGN BODY SENSATION IN THROAT: Primary | ICD-10-CM

## 2024-02-16 DIAGNOSIS — J38.4 LARYNGEAL EDEMA DETERMINED BY LARYNGOSCOPY: ICD-10-CM

## 2024-02-16 PROCEDURE — 99212 OFFICE O/P EST SF 10 MIN: CPT | Performed by: OTOLARYNGOLOGY

## 2024-02-16 NOTE — PROGRESS NOTES
history, allergies   and current medications were reviewed with the patient.     Assessment & Plan   Diagnoses and all orders for this visit:    {No diagnosis found. (Refresh or delete this SmartLink)}    The findings were explained and his questions were answered.      No follow-ups on file.         Kostas Rodriguez MD    **This report has been created using voice recognition software. It may contain minor errors which are inherent in voicerecognition technology.**

## 2024-04-22 RX ORDER — METOPROLOL SUCCINATE 50 MG/1
50 TABLET, EXTENDED RELEASE ORAL DAILY
Qty: 90 TABLET | Refills: 1 | Status: SHIPPED | OUTPATIENT
Start: 2024-04-22 | End: 2024-04-24 | Stop reason: SDUPTHER

## 2024-04-22 NOTE — TELEPHONE ENCOUNTER
Last visit- 9/20/2023  Next visit- 5/13/2024    Requested Prescriptions     Pending Prescriptions Disp Refills    metoprolol succinate (TOPROL XL) 50 MG extended release tablet 90 tablet 1     Sig: Take 1 tablet by mouth daily

## 2024-04-24 RX ORDER — METOPROLOL SUCCINATE 50 MG/1
50 TABLET, EXTENDED RELEASE ORAL DAILY
Qty: 90 TABLET | Refills: 1 | Status: SHIPPED | OUTPATIENT
Start: 2024-04-24

## 2024-05-09 NOTE — PROGRESS NOTES
[facility]  [facility]  [address]  [address]  [facility] [address]  Dept: [phone]  Dept Fax: [phone]  Loc: [phone]    Shashank Curran is a 54 y.o. male who presents today for:  Chief Complaint   Patient presents with    Annual Exam    Sinus Problem     Left side sinus pressure, nasal drip, cough, eyes watery - 1 month        HPI:     HPI  Here for yearly checkup    Hyperlipidemia: Patient presents with hyperlipidemia.  He was tested because screening.  His last labs showed   Lab Results   Component Value Date    CHOL 152 05/12/2023    CHOL 151 03/01/2022    CHOL 135 02/18/2021     Lab Results   Component Value Date    TRIG 134 05/12/2023    TRIG 194 03/01/2022    TRIG 111 02/18/2021     Lab Results   Component Value Date    HDL 36 05/12/2023    HDL 42 03/01/2022    HDL 41 02/18/2021     No components found for: \"LDLCHOLESTEROL\", \"LDLCALC\"    No results found for: \"VLDL\"  No results found for: \"CHOLHDLRATIO\"  There is a family history of hyperlipidemia. There is not a family history of early ischemia heart disease.    Blood pressures are higher at home.  Much better on lisinopril.   Dry cough started but better off the lisinopril and cough better on toprol.      GERD controlled on PPI prn and tums prn.   He is having more difficulty with swallowing the dry stringy foods.  Seems to happen more with PND.  Discussed taking prilosec for 90 days in a row to heal the reflux which is likely causing esophageal spasm.   This is better on the PPI and now using it prn again.    Allergies controlled on OTC seasonal allergy medications.  Using claritin not daily and flonase every morning 2 sprays each side and saline nasal spray 4-5 times daily.    Flaring for 1 month.  Just started zyrtec yesterday but using the flonase every morning 2 sprays each side and saline nasal spray 4-5 times daily. Sinus headache, sinus pressure, ears popping, PND, difficulty

## 2024-05-13 ENCOUNTER — OFFICE VISIT (OUTPATIENT)
Dept: FAMILY MEDICINE CLINIC | Age: 54
End: 2024-05-13
Payer: COMMERCIAL

## 2024-05-13 VITALS
OXYGEN SATURATION: 96 % | BODY MASS INDEX: 39.09 KG/M2 | WEIGHT: 288.58 LBS | SYSTOLIC BLOOD PRESSURE: 136 MMHG | TEMPERATURE: 97.2 F | HEIGHT: 72 IN | HEART RATE: 84 BPM | DIASTOLIC BLOOD PRESSURE: 86 MMHG | RESPIRATION RATE: 17 BRPM

## 2024-05-13 DIAGNOSIS — G47.33 OBSTRUCTIVE SLEEP APNEA: ICD-10-CM

## 2024-05-13 DIAGNOSIS — L57.8 SUN-DAMAGED SKIN: ICD-10-CM

## 2024-05-13 DIAGNOSIS — Z80.8 FAMILY HISTORY OF MELANOMA: ICD-10-CM

## 2024-05-13 DIAGNOSIS — M18.11 DEGENERATIVE ARTHRITIS OF THUMB, RIGHT: ICD-10-CM

## 2024-05-13 DIAGNOSIS — Z83.3 FAMILY HISTORY OF DIABETES MELLITUS IN FATHER: ICD-10-CM

## 2024-05-13 DIAGNOSIS — I10 HYPERTENSION, UNSPECIFIED TYPE: ICD-10-CM

## 2024-05-13 DIAGNOSIS — E66.9 OBESITY (BMI 30.0-34.9): ICD-10-CM

## 2024-05-13 DIAGNOSIS — R63.5 WEIGHT GAIN: ICD-10-CM

## 2024-05-13 DIAGNOSIS — R97.20 ELEVATED PSA: ICD-10-CM

## 2024-05-13 DIAGNOSIS — E78.00 PURE HYPERCHOLESTEROLEMIA: ICD-10-CM

## 2024-05-13 DIAGNOSIS — J30.2 SEASONAL ALLERGIES: ICD-10-CM

## 2024-05-13 DIAGNOSIS — E66.01 SEVERE OBESITY (BMI 35.0-39.9) WITH COMORBIDITY (HCC): ICD-10-CM

## 2024-05-13 DIAGNOSIS — G56.22 ULNAR TUNNEL SYNDROME, LEFT: ICD-10-CM

## 2024-05-13 DIAGNOSIS — R40.0 DAYTIME SOMNOLENCE: ICD-10-CM

## 2024-05-13 DIAGNOSIS — K21.9 GASTROESOPHAGEAL REFLUX DISEASE WITHOUT ESOPHAGITIS: ICD-10-CM

## 2024-05-13 DIAGNOSIS — J32.9 RECURRENT SINUSITIS: ICD-10-CM

## 2024-05-13 DIAGNOSIS — Z00.00 ROUTINE GENERAL MEDICAL EXAMINATION AT HEALTH CARE FACILITY: Primary | ICD-10-CM

## 2024-05-13 PROCEDURE — 99396 PREV VISIT EST AGE 40-64: CPT | Performed by: FAMILY MEDICINE

## 2024-05-13 PROCEDURE — 3075F SYST BP GE 130 - 139MM HG: CPT | Performed by: FAMILY MEDICINE

## 2024-05-13 PROCEDURE — 3079F DIAST BP 80-89 MM HG: CPT | Performed by: FAMILY MEDICINE

## 2024-05-13 RX ORDER — METHYLPREDNISOLONE 4 MG/1
TABLET ORAL
Qty: 1 KIT | Refills: 0 | Status: SHIPPED | OUTPATIENT
Start: 2024-05-13 | End: 2024-05-19

## 2024-05-13 SDOH — ECONOMIC STABILITY: FOOD INSECURITY: WITHIN THE PAST 12 MONTHS, THE FOOD YOU BOUGHT JUST DIDN'T LAST AND YOU DIDN'T HAVE MONEY TO GET MORE.: PATIENT DECLINED

## 2024-05-13 SDOH — ECONOMIC STABILITY: FOOD INSECURITY: WITHIN THE PAST 12 MONTHS, YOU WORRIED THAT YOUR FOOD WOULD RUN OUT BEFORE YOU GOT MONEY TO BUY MORE.: PATIENT DECLINED

## 2024-05-13 SDOH — ECONOMIC STABILITY: INCOME INSECURITY: HOW HARD IS IT FOR YOU TO PAY FOR THE VERY BASICS LIKE FOOD, HOUSING, MEDICAL CARE, AND HEATING?: PATIENT DECLINED

## 2024-05-13 ASSESSMENT — PATIENT HEALTH QUESTIONNAIRE - PHQ9
SUM OF ALL RESPONSES TO PHQ QUESTIONS 1-9: 0
SUM OF ALL RESPONSES TO PHQ9 QUESTIONS 1 & 2: 0
SUM OF ALL RESPONSES TO PHQ9 QUESTIONS 1 & 2: 0
2. FEELING DOWN, DEPRESSED OR HOPELESS: NOT AT ALL
1. LITTLE INTEREST OR PLEASURE IN DOING THINGS: NOT AT ALL
SUM OF ALL RESPONSES TO PHQ QUESTIONS 1-9: 0
1. LITTLE INTEREST OR PLEASURE IN DOING THINGS: NOT AT ALL
2. FEELING DOWN, DEPRESSED OR HOPELESS: NOT AT ALL

## 2024-05-17 ENCOUNTER — HOSPITAL ENCOUNTER (OUTPATIENT)
Age: 54
Discharge: HOME | End: 2024-05-17
Payer: COMMERCIAL

## 2024-05-17 DIAGNOSIS — R63.5 WEIGHT GAIN: ICD-10-CM

## 2024-05-17 DIAGNOSIS — Z00.00 ROUTINE GENERAL MEDICAL EXAMINATION AT HEALTH CARE FACILITY: ICD-10-CM

## 2024-05-17 DIAGNOSIS — Z83.3 FAMILY HISTORY OF DIABETES MELLITUS IN FATHER: ICD-10-CM

## 2024-05-17 LAB
ALBUMIN SERPL BCG-MCNC: 4.2 G/DL (ref 3.5–5.1)
ALP SERPL-CCNC: 58 U/L (ref 38–126)
ALT SERPL W/O P-5'-P-CCNC: 24 U/L (ref 11–66)
ANION GAP SERPL CALC-SCNC: 11 MEQ/L (ref 8–16)
AST SERPL-CCNC: 16 U/L (ref 5–40)
BILIRUB SERPL-MCNC: 0.3 MG/DL (ref 0.3–1.2)
BUN SERPL-MCNC: 19 MG/DL (ref 7–22)
CALCIUM SERPL-MCNC: 9.4 MG/DL (ref 8.5–10.5)
CHLORIDE SERPL-SCNC: 107 MEQ/L (ref 98–111)
CHOLEST SERPL-MCNC: 160 MG/DL (ref 100–199)
CO2 SERPL-SCNC: 23 MEQ/L (ref 23–33)
CREAT SERPL-MCNC: 1 MG/DL (ref 0.4–1.2)
DEPRECATED MEAN GLUCOSE BLD GHB EST-ACNC: 111 MG/DL (ref 70–126)
DEPRECATED RDW RBC AUTO: 45.5 FL (ref 35–45)
ERYTHROCYTE [DISTWIDTH] IN BLOOD BY AUTOMATED COUNT: 13.6 % (ref 11.5–14.5)
GFR SERPL CREATININE-BSD FRML MDRD: 90 ML/MIN/1.73M2
GLUCOSE FASTING: 104 MG/DL (ref 70–108)
HBA1C MFR BLD HPLC: 5.7 % (ref 4.4–6.4)
HCT VFR BLD AUTO: 43.1 % (ref 42–52)
HDLC SERPL-MCNC: 32 MG/DL
HGB BLD-MCNC: 13.9 GM/DL (ref 14–18)
INSULIN SERPL-ACNC: 24 MU/L
LDLC SERPL CALC-MCNC: 91 MG/DL
MCH RBC QN AUTO: 29.7 PG (ref 26–33)
MCHC RBC AUTO-ENTMCNC: 32.3 GM/DL (ref 32.2–35.5)
MCV RBC AUTO: 92.1 FL (ref 80–94)
PLATELET # BLD AUTO: 260 THOU/MM3 (ref 130–400)
PMV BLD AUTO: 10.1 FL (ref 9.4–12.4)
POTASSIUM SERPL-SCNC: 4.2 MEQ/L (ref 3.5–5.2)
PROT SERPL-MCNC: 6.8 G/DL (ref 6.1–8)
PSA SERPL-MCNC: 1.52 NG/ML (ref 0–1)
RBC # BLD AUTO: 4.68 MILL/MM3 (ref 4.7–6.1)
SODIUM SERPL-SCNC: 141 MEQ/L (ref 135–145)
TRIGL SERPL-MCNC: 184 MG/DL (ref 0–199)
TSH SERPL DL<=0.005 MIU/L-ACNC: 1.71 UIU/ML (ref 0.4–4.2)
WBC # BLD AUTO: 7.1 THOU/MM3 (ref 4.8–10.8)

## 2024-05-17 PROCEDURE — 36415 COLL VENOUS BLD VENIPUNCTURE: CPT

## 2024-05-17 PROCEDURE — 83036 HEMOGLOBIN GLYCOSYLATED A1C: CPT

## 2024-05-17 PROCEDURE — 80061 LIPID PANEL: CPT

## 2024-05-17 PROCEDURE — 80053 COMPREHEN METABOLIC PANEL: CPT

## 2024-05-17 PROCEDURE — 84443 ASSAY THYROID STIM HORMONE: CPT

## 2024-05-17 PROCEDURE — 85027 COMPLETE CBC AUTOMATED: CPT

## 2024-05-17 PROCEDURE — 83525 ASSAY OF INSULIN: CPT

## 2024-05-17 PROCEDURE — 84153 ASSAY OF PSA TOTAL: CPT

## 2024-07-15 DIAGNOSIS — E78.00 PURE HYPERCHOLESTEROLEMIA: ICD-10-CM

## 2024-07-15 RX ORDER — ROSUVASTATIN CALCIUM 20 MG/1
20 TABLET, COATED ORAL DAILY
Qty: 90 TABLET | Refills: 5 | Status: SHIPPED | OUTPATIENT
Start: 2024-07-15

## 2024-07-15 RX ORDER — FENOFIBRATE 160 MG/1
TABLET ORAL
Qty: 90 TABLET | Refills: 5 | Status: SHIPPED | OUTPATIENT
Start: 2024-07-15

## 2024-07-15 NOTE — TELEPHONE ENCOUNTER
Last visit- 5/13/2024  Next visit- Visit date not found    Requested Prescriptions     Pending Prescriptions Disp Refills    rosuvastatin (CRESTOR) 20 MG tablet 90 tablet 5     Sig: Take 1 tablet by mouth daily    fenofibrate (TRIGLIDE) 160 MG tablet 90 tablet 5     Sig: TAKE 1 TABLET DAILY

## 2024-07-22 DIAGNOSIS — E78.00 PURE HYPERCHOLESTEROLEMIA: ICD-10-CM

## 2024-07-22 RX ORDER — ROSUVASTATIN CALCIUM 20 MG/1
20 TABLET, COATED ORAL DAILY
Qty: 90 TABLET | Refills: 1 | Status: SHIPPED | OUTPATIENT
Start: 2024-07-22

## 2024-07-22 RX ORDER — METOPROLOL SUCCINATE 50 MG/1
50 TABLET, EXTENDED RELEASE ORAL DAILY
Qty: 90 TABLET | Refills: 1 | Status: SHIPPED | OUTPATIENT
Start: 2024-07-22

## 2024-07-22 NOTE — TELEPHONE ENCOUNTER
Last visit- 5/13/2024  Next visit- Visit date not found    Requested Prescriptions     Pending Prescriptions Disp Refills    metoprolol succinate (TOPROL XL) 50 MG extended release tablet 90 tablet 1     Sig: Take 1 tablet by mouth daily

## 2024-07-29 DIAGNOSIS — E78.00 PURE HYPERCHOLESTEROLEMIA: ICD-10-CM

## 2024-07-29 RX ORDER — FENOFIBRATE 160 MG/1
TABLET ORAL
Qty: 90 TABLET | Refills: 1 | Status: SHIPPED | OUTPATIENT
Start: 2024-07-29

## 2024-07-29 RX ORDER — METOPROLOL SUCCINATE 50 MG/1
50 TABLET, EXTENDED RELEASE ORAL DAILY
Qty: 90 TABLET | Refills: 1 | Status: SHIPPED | OUTPATIENT
Start: 2024-07-29

## 2024-08-28 ENCOUNTER — OFFICE VISIT (OUTPATIENT)
Dept: FAMILY MEDICINE CLINIC | Age: 54
End: 2024-08-28
Payer: COMMERCIAL

## 2024-08-28 ENCOUNTER — TELEPHONE (OUTPATIENT)
Dept: FAMILY MEDICINE CLINIC | Age: 54
End: 2024-08-28

## 2024-08-28 VITALS
BODY MASS INDEX: 38.16 KG/M2 | SYSTOLIC BLOOD PRESSURE: 134 MMHG | TEMPERATURE: 96.9 F | RESPIRATION RATE: 17 BRPM | WEIGHT: 281.75 LBS | DIASTOLIC BLOOD PRESSURE: 86 MMHG | HEART RATE: 75 BPM | OXYGEN SATURATION: 96 % | HEIGHT: 72 IN

## 2024-08-28 DIAGNOSIS — B96.89 ACUTE BACTERIAL SINUSITIS: Primary | ICD-10-CM

## 2024-08-28 DIAGNOSIS — J01.90 ACUTE BACTERIAL SINUSITIS: Primary | ICD-10-CM

## 2024-08-28 PROCEDURE — 96372 THER/PROPH/DIAG INJ SC/IM: CPT | Performed by: FAMILY MEDICINE

## 2024-08-28 PROCEDURE — 99213 OFFICE O/P EST LOW 20 MIN: CPT | Performed by: FAMILY MEDICINE

## 2024-08-28 RX ORDER — CEFDINIR 300 MG/1
300 CAPSULE ORAL 2 TIMES DAILY
Qty: 20 CAPSULE | Refills: 0 | Status: SHIPPED | OUTPATIENT
Start: 2024-08-28 | End: 2024-09-07

## 2024-08-28 RX ORDER — METHYLPREDNISOLONE 4 MG
TABLET, DOSE PACK ORAL
Qty: 1 KIT | Refills: 0 | Status: SHIPPED | OUTPATIENT
Start: 2024-08-28 | End: 2024-09-03

## 2024-08-28 RX ORDER — TRIAMCINOLONE ACETONIDE 40 MG/ML
40 INJECTION, SUSPENSION INTRA-ARTICULAR; INTRAMUSCULAR ONCE
Status: COMPLETED | OUTPATIENT
Start: 2024-08-28 | End: 2024-08-28

## 2024-08-28 RX ADMIN — TRIAMCINOLONE ACETONIDE 40 MG: 40 INJECTION, SUSPENSION INTRA-ARTICULAR; INTRAMUSCULAR at 17:01

## 2024-08-28 NOTE — TELEPHONE ENCOUNTER
Patient called c/o sinus congestion-yellow/green drainage, cough x 6 days.  Patient has been taking OTC sudafed, cough drops and aspirin.  Patient requesting rx be sent to [place]'s pharmacy.

## 2024-08-28 NOTE — PROGRESS NOTES
[facility]  [facility] MED[facility]  [address]  [address]  [facility] [address]  Dept: [phone]  Dept Fax: [phone]  Loc: [phone]    Shashank Curran is a 54 y.o. male who presents today for:  Chief Complaint   Patient presents with    Cough    Congestion    Sinus Problem       HPI:     HPI  5 days of cough and nasal congestion.  Clear drainage and now yellow and thick..  associated with PND, ears popping, low appetite and low energy, minor elevated temp.  Tried:  sudafed and tylenol and cough drops temporary relief.    Reviewed chart forpast medical history , surgical history , allergies, social history , family history and medications.    Health Maintenance   Topic Date Due    Hepatitis C screen  Never done    Hepatitis B vaccine (1 of 3 - 19+ 3-dose series) Never done    Shingles vaccine (1 of 2) Never done    COVID-19 Vaccine (3 - 2023-24 season) 09/01/2023    Flu vaccine (1) 08/01/2024    Depression Screen  05/13/2025    A1C test (Diabetic or Prediabetic)  05/17/2025    Lipids  05/17/2025    DTaP/Tdap/Td vaccine (2 - Td or Tdap) 10/22/2025    Colorectal Cancer Screen  02/25/2032    HIV screen  Completed    Hepatitis A vaccine  Aged Out    Hib vaccine  Aged Out    Polio vaccine  Aged Out    Meningococcal (ACWY) vaccine  Aged Out    Pneumococcal 0-64 years Vaccine  Aged Out    Diabetes screen  Discontinued       Subjective:      Constitutional:Negative for fever, chills, diaphoresis, activity change, appetite change and fatigue.   HENT: Negative for hearing loss, ear pain, congestion, sore throat, rhinorrhea, postnasal drip and ear discharge.    Eyes: Negative for photophobia and visual disturbance.   Respiratory: Negative for cough, chest tightness, shortness of breath and wheezing.    Cardiovascular: Negative for chest pain and leg swelling.   Gastrointestinal: Negative for nausea, vomiting, abdominal pain, diarrhea and constipation.   Genitourinary:

## 2024-09-24 ENCOUNTER — TELEPHONE (OUTPATIENT)
Dept: FAMILY MEDICINE CLINIC | Age: 54
End: 2024-09-24

## 2024-09-24 RX ORDER — DOXYCYCLINE HYCLATE 100 MG
100 TABLET ORAL 2 TIMES DAILY
Qty: 20 TABLET | Refills: 0 | Status: SHIPPED | OUTPATIENT
Start: 2024-09-24 | End: 2024-10-04

## 2025-01-20 DIAGNOSIS — E78.00 PURE HYPERCHOLESTEROLEMIA: ICD-10-CM

## 2025-01-20 RX ORDER — ROSUVASTATIN CALCIUM 20 MG/1
20 TABLET, COATED ORAL DAILY
Qty: 90 TABLET | Refills: 3 | Status: SHIPPED | OUTPATIENT
Start: 2025-01-20

## 2025-01-20 NOTE — TELEPHONE ENCOUNTER
Last visit- 8/28/2024  Next visit- Visit date not found    Requested Prescriptions     Pending Prescriptions Disp Refills    rosuvastatin (CRESTOR) 20 MG tablet [Pharmacy Med Name: ROSUVASTATIN TABS 20MG] 90 tablet 3     Sig: TAKE 1 TABLET DAILY

## 2025-01-27 DIAGNOSIS — E78.00 PURE HYPERCHOLESTEROLEMIA: ICD-10-CM

## 2025-01-27 RX ORDER — FENOFIBRATE 160 MG/1
TABLET ORAL
Qty: 90 TABLET | Refills: 3 | Status: SHIPPED | OUTPATIENT
Start: 2025-01-27

## 2025-01-27 NOTE — TELEPHONE ENCOUNTER
Last visit- 8/28/2024  Next visit- 5/20/2025    Requested Prescriptions     Pending Prescriptions Disp Refills    fenofibrate 160 MG tablet [Pharmacy Med Name: FENOFIBRATE TABS 160MG] 90 tablet 3     Sig: TAKE 1 TABLET DAILY

## 2025-02-17 RX ORDER — METOPROLOL SUCCINATE 50 MG/1
50 TABLET, EXTENDED RELEASE ORAL DAILY
Qty: 90 TABLET | Refills: 3 | Status: SHIPPED | OUTPATIENT
Start: 2025-02-17

## 2025-02-17 NOTE — TELEPHONE ENCOUNTER
Last visit- 8/28/2024  Next visit- 5/20/2025    Requested Prescriptions     Pending Prescriptions Disp Refills    metoprolol succinate (TOPROL XL) 50 MG extended release tablet [Pharmacy Med Name: METOPROLOL SUCCINATE ER TABS 50MG] 90 tablet 3     Sig: TAKE 1 TABLET DAILY

## 2025-05-20 ENCOUNTER — OFFICE VISIT (OUTPATIENT)
Dept: FAMILY MEDICINE CLINIC | Age: 55
End: 2025-05-20
Payer: COMMERCIAL

## 2025-05-20 VITALS
BODY MASS INDEX: 40.74 KG/M2 | DIASTOLIC BLOOD PRESSURE: 72 MMHG | HEART RATE: 67 BPM | OXYGEN SATURATION: 96 % | RESPIRATION RATE: 18 BRPM | TEMPERATURE: 97.2 F | WEIGHT: 300.49 LBS | SYSTOLIC BLOOD PRESSURE: 138 MMHG

## 2025-05-20 DIAGNOSIS — R97.20 ELEVATED PSA: ICD-10-CM

## 2025-05-20 DIAGNOSIS — R06.02 SOB (SHORTNESS OF BREATH) ON EXERTION: ICD-10-CM

## 2025-05-20 DIAGNOSIS — Z80.8 FAMILY HISTORY OF MELANOMA: ICD-10-CM

## 2025-05-20 DIAGNOSIS — E66.01 SEVERE OBESITY (BMI 35.0-39.9) WITH COMORBIDITY (HCC): ICD-10-CM

## 2025-05-20 DIAGNOSIS — K21.9 GASTROESOPHAGEAL REFLUX DISEASE WITHOUT ESOPHAGITIS: ICD-10-CM

## 2025-05-20 DIAGNOSIS — E78.00 PURE HYPERCHOLESTEROLEMIA: ICD-10-CM

## 2025-05-20 DIAGNOSIS — R40.0 DAYTIME SOMNOLENCE: ICD-10-CM

## 2025-05-20 DIAGNOSIS — Z00.00 ROUTINE GENERAL MEDICAL EXAMINATION AT A HEALTH CARE FACILITY: Primary | ICD-10-CM

## 2025-05-20 DIAGNOSIS — R60.0 PEDAL EDEMA: ICD-10-CM

## 2025-05-20 DIAGNOSIS — Z83.3 FAMILY HISTORY OF DIABETES MELLITUS IN FATHER: ICD-10-CM

## 2025-05-20 DIAGNOSIS — G47.33 OBSTRUCTIVE SLEEP APNEA: ICD-10-CM

## 2025-05-20 DIAGNOSIS — G56.22 ULNAR TUNNEL SYNDROME, LEFT: ICD-10-CM

## 2025-05-20 DIAGNOSIS — J32.9 RECURRENT SINUSITIS: ICD-10-CM

## 2025-05-20 DIAGNOSIS — I10 HYPERTENSION, UNSPECIFIED TYPE: ICD-10-CM

## 2025-05-20 DIAGNOSIS — E66.811 OBESITY (BMI 30.0-34.9): ICD-10-CM

## 2025-05-20 DIAGNOSIS — M18.11 DEGENERATIVE ARTHRITIS OF THUMB, RIGHT: ICD-10-CM

## 2025-05-20 DIAGNOSIS — J30.2 SEASONAL ALLERGIES: ICD-10-CM

## 2025-05-20 DIAGNOSIS — L57.8 SUN-DAMAGED SKIN: ICD-10-CM

## 2025-05-20 PROCEDURE — 99396 PREV VISIT EST AGE 40-64: CPT | Performed by: FAMILY MEDICINE

## 2025-05-20 PROCEDURE — 99213 OFFICE O/P EST LOW 20 MIN: CPT | Performed by: FAMILY MEDICINE

## 2025-05-20 PROCEDURE — 3075F SYST BP GE 130 - 139MM HG: CPT | Performed by: FAMILY MEDICINE

## 2025-05-20 PROCEDURE — 3078F DIAST BP <80 MM HG: CPT | Performed by: FAMILY MEDICINE

## 2025-05-20 RX ORDER — AZELASTINE 1 MG/ML
2 SPRAY, METERED NASAL 2 TIMES DAILY
Qty: 3 EACH | Refills: 3 | Status: SHIPPED | OUTPATIENT
Start: 2025-05-20

## 2025-05-20 RX ORDER — TRIAMCINOLONE ACETONIDE 40 MG/ML
40 INJECTION, SUSPENSION INTRA-ARTICULAR; INTRAMUSCULAR ONCE
Status: COMPLETED | OUTPATIENT
Start: 2025-05-20 | End: 2025-05-28

## 2025-05-20 SDOH — ECONOMIC STABILITY: FOOD INSECURITY: WITHIN THE PAST 12 MONTHS, YOU WORRIED THAT YOUR FOOD WOULD RUN OUT BEFORE YOU GOT MONEY TO BUY MORE.: NEVER TRUE

## 2025-05-20 SDOH — ECONOMIC STABILITY: FOOD INSECURITY: WITHIN THE PAST 12 MONTHS, THE FOOD YOU BOUGHT JUST DIDN'T LAST AND YOU DIDN'T HAVE MONEY TO GET MORE.: NEVER TRUE

## 2025-05-20 ASSESSMENT — PATIENT HEALTH QUESTIONNAIRE - PHQ9
SUM OF ALL RESPONSES TO PHQ QUESTIONS 1-9: 0
1. LITTLE INTEREST OR PLEASURE IN DOING THINGS: NOT AT ALL
2. FEELING DOWN, DEPRESSED OR HOPELESS: NOT AT ALL

## 2025-05-20 NOTE — PROGRESS NOTES
significant enough for CPAP.  Wears a mouth guard and seems to be doing well with the snoring.    Right wrist pain in the am better as the day goes on.  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    Discussed braces at night and ice at the end of the day.   Doing well using the braces at night needing no further intervention.    History of Present Illness  The patient is a 55-year-old male who presents for a physical exam and evaluation of nasal congestion, calf pain, and weight management.    He has been experiencing nasal congestion, which he attributes to allergies, for the past 2 months. His symptoms have worsened over the last 3 days, but he notes an improvement on rainy days. He describes his breathing as akin to inhaling through a straw, accompanied by wheezing. He has been self-medicating with Zyrtec, saline, and Flonase, but these have provided minimal relief. He uses Flonase twice daily and performs nasal irrigation with a squirt bottle approximately 4 times per week. He takes Zyrtec at night due to concerns about drowsiness. He is interested in exploring allergy injections as a potential treatment option.    He also reports difficulty sleeping, which he partially attributes to work-related stress. He experiences loud and labored breathing at night, which often wakes him up. He has undergone sleep studies in the past, the most recent being in 2018. He currently uses a mouth guard, which has effectively eliminated his snoring, but his heavy breathing persists.    He reports experiencing shortness of breath, which he believes is exacerbated by his inability to lose weight. He does not experience chest tightness. He maintains an active lifestyle, walking 3 to 4 days a week and achieving 10,000 steps daily. He underwent a stress test a few years ago, which yielded normal results.    He reports constant tightness in his calves, which he manages by stretching them on an incline. He

## 2025-05-23 ENCOUNTER — LAB (OUTPATIENT)
Dept: LAB | Age: 55
End: 2025-05-23

## 2025-05-23 DIAGNOSIS — Z00.00 ROUTINE GENERAL MEDICAL EXAMINATION AT A HEALTH CARE FACILITY: ICD-10-CM

## 2025-05-23 DIAGNOSIS — Z83.3 FAMILY HISTORY OF DIABETES MELLITUS IN FATHER: ICD-10-CM

## 2025-05-23 DIAGNOSIS — R06.02 SOB (SHORTNESS OF BREATH) ON EXERTION: ICD-10-CM

## 2025-05-23 DIAGNOSIS — R60.0 PEDAL EDEMA: ICD-10-CM

## 2025-05-23 LAB
ALBUMIN SERPL BCG-MCNC: 4.3 G/DL (ref 3.4–4.9)
ALP SERPL-CCNC: 59 U/L (ref 40–129)
ALT SERPL W/O P-5'-P-CCNC: 49 U/L (ref 10–50)
ANION GAP SERPL CALC-SCNC: 13 MEQ/L (ref 8–16)
AST SERPL-CCNC: 44 U/L (ref 10–50)
BILIRUB SERPL-MCNC: 0.6 MG/DL (ref 0.3–1.2)
BUN SERPL-MCNC: 20 MG/DL (ref 8–23)
CALCIUM SERPL-MCNC: 9.8 MG/DL (ref 8.6–10)
CHLORIDE SERPL-SCNC: 106 MEQ/L (ref 98–111)
CHOLEST SERPL-MCNC: 154 MG/DL (ref 100–199)
CO2 SERPL-SCNC: 21 MEQ/L (ref 22–29)
CREAT SERPL-MCNC: 1.1 MG/DL (ref 0.7–1.2)
DEPRECATED MEAN GLUCOSE BLD GHB EST-ACNC: 114 MG/DL (ref 70–126)
DEPRECATED RDW RBC AUTO: 43.2 FL (ref 35–45)
ERYTHROCYTE [DISTWIDTH] IN BLOOD BY AUTOMATED COUNT: 13.1 % (ref 11.5–14.5)
GFR SERPL CREATININE-BSD FRML MDRD: 80 ML/MIN/1.73M2
GLUCOSE FASTING: 101 MG/DL (ref 74–109)
HBA1C MFR BLD HPLC: 5.8 % (ref 4–6)
HCT VFR BLD AUTO: 42.8 % (ref 42–52)
HDLC SERPL-MCNC: 25 MG/DL
HGB BLD-MCNC: 14.4 GM/DL (ref 14–18)
INSULIN SERPL-ACNC: 15.9 MU/L
LDLC SERPL CALC-MCNC: 71 MG/DL
MCH RBC QN AUTO: 30.3 PG (ref 26–33)
MCHC RBC AUTO-ENTMCNC: 33.6 GM/DL (ref 32.2–35.5)
MCV RBC AUTO: 89.9 FL (ref 80–94)
NT-PROBNP SERPL IA-MCNC: < 36 PG/ML (ref 0–124)
PLATELET # BLD AUTO: 244 THOU/MM3 (ref 130–400)
PMV BLD AUTO: 10.5 FL (ref 9.4–12.4)
POTASSIUM SERPL-SCNC: 4.3 MEQ/L (ref 3.5–5.2)
PROT SERPL-MCNC: 6.7 G/DL (ref 6.4–8.3)
PSA SERPL-MCNC: 2.21 NG/ML (ref 0–1)
RBC # BLD AUTO: 4.76 MILL/MM3 (ref 4.7–6.1)
SODIUM SERPL-SCNC: 140 MEQ/L (ref 135–145)
TRIGL SERPL-MCNC: 289 MG/DL (ref 0–199)
TSH SERPL DL<=0.05 MIU/L-ACNC: 2 UIU/ML (ref 0.27–4.2)
WBC # BLD AUTO: 5.1 THOU/MM3 (ref 4.8–10.8)

## 2025-05-26 ENCOUNTER — RESULTS FOLLOW-UP (OUTPATIENT)
Dept: FAMILY MEDICINE CLINIC | Age: 55
End: 2025-05-26

## 2025-05-26 NOTE — RESULT ENCOUNTER NOTE
Labs look good except elevated triglycerides  Is he taking the fenofibrate?  PSA elevated slightly , recheck PSA in 6 months  Call patient

## 2025-05-28 ENCOUNTER — CLINICAL SUPPORT (OUTPATIENT)
Dept: FAMILY MEDICINE CLINIC | Age: 55
End: 2025-05-28
Payer: COMMERCIAL

## 2025-05-28 ENCOUNTER — HOSPITAL ENCOUNTER (OUTPATIENT)
Dept: CT IMAGING | Age: 55
Discharge: HOME | End: 2025-05-28
Attending: FAMILY MEDICINE
Payer: COMMERCIAL

## 2025-05-28 DIAGNOSIS — J32.9 RECURRENT SINUSITIS: ICD-10-CM

## 2025-05-28 DIAGNOSIS — J30.2 SEASONAL ALLERGIES: Primary | ICD-10-CM

## 2025-05-28 PROCEDURE — 96372 THER/PROPH/DIAG INJ SC/IM: CPT | Performed by: FAMILY MEDICINE

## 2025-05-28 PROCEDURE — 70486 CT MAXILLOFACIAL W/O DYE: CPT

## 2025-05-28 RX ADMIN — TRIAMCINOLONE ACETONIDE 40 MG: 40 INJECTION, SUSPENSION INTRA-ARTICULAR; INTRAMUSCULAR at 15:33

## 2025-05-28 NOTE — PROGRESS NOTES
Administrations This Visit       triamcinolone acetonide (KENALOG-40) injection 40 mg       Admin Date  05/28/2025  15:33 Action  Given Dose  40 mg Route  IntraMUSCular Site  Ventrogluteal Right Documented By  Charlene Choe CMA ([facility])    NDC: 5307-7026-99    Lot#: [id number]    : [employer] (PRIMARY CARE)    Patient Supplied?: No                    Patient instructed to remain in clinic for 20 minutes after injection and was advised to report any adverse reaction to me immediately.